# Patient Record
Sex: FEMALE | Race: WHITE
[De-identification: names, ages, dates, MRNs, and addresses within clinical notes are randomized per-mention and may not be internally consistent; named-entity substitution may affect disease eponyms.]

---

## 2017-07-22 ENCOUNTER — HEALTH MAINTENANCE LETTER (OUTPATIENT)
Age: 62
End: 2017-07-22

## 2017-11-29 ENCOUNTER — TRANSFERRED RECORDS (OUTPATIENT)
Dept: HEALTH INFORMATION MANAGEMENT | Facility: CLINIC | Age: 62
End: 2017-11-29

## 2018-01-29 ENCOUNTER — TRANSFERRED RECORDS (OUTPATIENT)
Dept: HEALTH INFORMATION MANAGEMENT | Facility: CLINIC | Age: 63
End: 2018-01-29

## 2018-03-01 ENCOUNTER — HOSPITAL ENCOUNTER (OUTPATIENT)
Dept: LAB | Facility: CLINIC | Age: 63
Discharge: HOME | End: 2018-03-01
Attending: ORTHOPAEDIC SURGERY | Admitting: ORTHOPAEDIC SURGERY
Payer: COMMERCIAL

## 2018-03-01 DIAGNOSIS — Z01.812 PRE-OPERATIVE LABORATORY EXAMINATION: ICD-10-CM

## 2018-03-01 LAB
MRSA DNA SPEC QL NAA+PROBE: NEGATIVE
SPECIMEN SOURCE: NORMAL

## 2018-03-01 PROCEDURE — 40000829 ZZHCL STATISTIC STAPH AUREUS SUSCEPT SCREEN PCR: Performed by: ORTHOPAEDIC SURGERY

## 2018-03-01 PROCEDURE — 87641 MR-STAPH DNA AMP PROBE: CPT | Performed by: ORTHOPAEDIC SURGERY

## 2018-03-01 PROCEDURE — 40000830 ZZHCL STATISTIC STAPH AUREUS METH RESIST SCREEN PCR: Performed by: ORTHOPAEDIC SURGERY

## 2018-03-01 PROCEDURE — 87640 STAPH A DNA AMP PROBE: CPT | Performed by: ORTHOPAEDIC SURGERY

## 2018-03-28 ENCOUNTER — ANESTHESIA EVENT (OUTPATIENT)
Dept: SURGERY | Facility: CLINIC | Age: 63
DRG: 470 | End: 2018-03-28
Payer: COMMERCIAL

## 2018-03-29 ENCOUNTER — SURGERY (OUTPATIENT)
Age: 63
End: 2018-03-29

## 2018-03-29 ENCOUNTER — APPOINTMENT (OUTPATIENT)
Dept: PHYSICAL THERAPY | Facility: CLINIC | Age: 63
DRG: 470 | End: 2018-03-29
Attending: ORTHOPAEDIC SURGERY
Payer: COMMERCIAL

## 2018-03-29 ENCOUNTER — ANESTHESIA (OUTPATIENT)
Dept: SURGERY | Facility: CLINIC | Age: 63
DRG: 470 | End: 2018-03-29
Payer: COMMERCIAL

## 2018-03-29 ENCOUNTER — HOSPITAL ENCOUNTER (INPATIENT)
Facility: CLINIC | Age: 63
LOS: 1 days | Discharge: HOME | DRG: 470 | End: 2018-03-30
Attending: ORTHOPAEDIC SURGERY | Admitting: ORTHOPAEDIC SURGERY
Payer: COMMERCIAL

## 2018-03-29 DIAGNOSIS — Z96.659 STATUS POST TOTAL KNEE REPLACEMENT, UNSPECIFIED LATERALITY: Primary | ICD-10-CM

## 2018-03-29 LAB
ABO + RH BLD: NORMAL
ABO + RH BLD: NORMAL
ALBUMIN UR-MCNC: NEGATIVE MG/DL
APPEARANCE UR: CLEAR
BILIRUB UR QL STRIP: NEGATIVE
BLD GP AB SCN SERPL QL: NORMAL
BLOOD BANK CMNT PATIENT-IMP: NORMAL
COLOR UR AUTO: YELLOW
GLUCOSE SERPL-MCNC: 99 MG/DL (ref 70–99)
GLUCOSE UR STRIP-MCNC: NEGATIVE MG/DL
HCG UR QL: NEGATIVE
HGB BLD-MCNC: 14 G/DL (ref 11.7–15.7)
HGB UR QL STRIP: NEGATIVE
INR PPP: 1.17 (ref 0.86–1.14)
KETONES UR STRIP-MCNC: NEGATIVE MG/DL
LEUKOCYTE ESTERASE UR QL STRIP: NEGATIVE
MUCOUS THREADS #/AREA URNS LPF: PRESENT /LPF
NITRATE UR QL: NEGATIVE
PH UR STRIP: 5.5 PH (ref 5–7)
RBC #/AREA URNS AUTO: <1 /HPF (ref 0–2)
SOURCE: ABNORMAL
SP GR UR STRIP: 1.02 (ref 1–1.03)
SPECIMEN EXP DATE BLD: NORMAL
SQUAMOUS #/AREA URNS AUTO: <1 /HPF (ref 0–1)
UROBILINOGEN UR STRIP-MCNC: NORMAL MG/DL (ref 0–2)
WBC #/AREA URNS AUTO: 1 /HPF (ref 0–5)

## 2018-03-29 PROCEDURE — 86850 RBC ANTIBODY SCREEN: CPT | Performed by: ORTHOPAEDIC SURGERY

## 2018-03-29 PROCEDURE — 81001 URINALYSIS AUTO W/SCOPE: CPT | Performed by: ORTHOPAEDIC SURGERY

## 2018-03-29 PROCEDURE — 97110 THERAPEUTIC EXERCISES: CPT | Mod: GP | Performed by: PHYSICAL THERAPIST

## 2018-03-29 PROCEDURE — 25000128 H RX IP 250 OP 636: Performed by: ORTHOPAEDIC SURGERY

## 2018-03-29 PROCEDURE — 97530 THERAPEUTIC ACTIVITIES: CPT | Mod: GP | Performed by: PHYSICAL THERAPIST

## 2018-03-29 PROCEDURE — 27210995 ZZH RX 272: Performed by: ORTHOPAEDIC SURGERY

## 2018-03-29 PROCEDURE — 85610 PROTHROMBIN TIME: CPT | Performed by: ORTHOPAEDIC SURGERY

## 2018-03-29 PROCEDURE — 82947 ASSAY GLUCOSE BLOOD QUANT: CPT | Performed by: ORTHOPAEDIC SURGERY

## 2018-03-29 PROCEDURE — 25000128 H RX IP 250 OP 636: Performed by: NURSE ANESTHETIST, CERTIFIED REGISTERED

## 2018-03-29 PROCEDURE — 86901 BLOOD TYPING SEROLOGIC RH(D): CPT | Performed by: ORTHOPAEDIC SURGERY

## 2018-03-29 PROCEDURE — 36415 COLL VENOUS BLD VENIPUNCTURE: CPT | Performed by: ORTHOPAEDIC SURGERY

## 2018-03-29 PROCEDURE — 27810169 ZZH OR IMPLANT GENERAL: Performed by: ORTHOPAEDIC SURGERY

## 2018-03-29 PROCEDURE — 85018 HEMOGLOBIN: CPT | Performed by: ORTHOPAEDIC SURGERY

## 2018-03-29 PROCEDURE — 12000001 ZZH R&B MED SURG/OB UMMC

## 2018-03-29 PROCEDURE — 86900 BLOOD TYPING SEROLOGIC ABO: CPT | Performed by: ORTHOPAEDIC SURGERY

## 2018-03-29 PROCEDURE — 71000014 ZZH RECOVERY PHASE 1 LEVEL 2 FIRST HR: Performed by: ORTHOPAEDIC SURGERY

## 2018-03-29 PROCEDURE — 25800025 ZZH RX 258: Performed by: ORTHOPAEDIC SURGERY

## 2018-03-29 PROCEDURE — 25000125 ZZHC RX 250: Performed by: NURSE ANESTHETIST, CERTIFIED REGISTERED

## 2018-03-29 PROCEDURE — 36000064 ZZH SURGERY LEVEL 4 EA 15 ADDTL MIN - UMMC: Performed by: ORTHOPAEDIC SURGERY

## 2018-03-29 PROCEDURE — 27210794 ZZH OR GENERAL SUPPLY STERILE: Performed by: ORTHOPAEDIC SURGERY

## 2018-03-29 PROCEDURE — 25000132 ZZH RX MED GY IP 250 OP 250 PS 637: Performed by: ORTHOPAEDIC SURGERY

## 2018-03-29 PROCEDURE — 40000170 ZZH STATISTIC PRE-PROCEDURE ASSESSMENT II: Performed by: ORTHOPAEDIC SURGERY

## 2018-03-29 PROCEDURE — 25000125 ZZHC RX 250: Performed by: ORTHOPAEDIC SURGERY

## 2018-03-29 PROCEDURE — 40000193 ZZH STATISTIC PT WARD VISIT: Performed by: PHYSICAL THERAPIST

## 2018-03-29 PROCEDURE — 0SRD0J9 REPLACEMENT OF LEFT KNEE JOINT WITH SYNTHETIC SUBSTITUTE, CEMENTED, OPEN APPROACH: ICD-10-PCS | Performed by: ORTHOPAEDIC SURGERY

## 2018-03-29 PROCEDURE — 37000009 ZZH ANESTHESIA TECHNICAL FEE, EACH ADDTL 15 MIN: Performed by: ORTHOPAEDIC SURGERY

## 2018-03-29 PROCEDURE — 97161 PT EVAL LOW COMPLEX 20 MIN: CPT | Mod: GP | Performed by: PHYSICAL THERAPIST

## 2018-03-29 PROCEDURE — 81025 URINE PREGNANCY TEST: CPT | Performed by: ORTHOPAEDIC SURGERY

## 2018-03-29 PROCEDURE — C1776 JOINT DEVICE (IMPLANTABLE): HCPCS | Performed by: ORTHOPAEDIC SURGERY

## 2018-03-29 PROCEDURE — 36000062 ZZH SURGERY LEVEL 4 1ST 30 MIN - UMMC: Performed by: ORTHOPAEDIC SURGERY

## 2018-03-29 PROCEDURE — 0QPH04Z REMOVAL OF INTERNAL FIXATION DEVICE FROM LEFT TIBIA, OPEN APPROACH: ICD-10-PCS | Performed by: ORTHOPAEDIC SURGERY

## 2018-03-29 PROCEDURE — 37000008 ZZH ANESTHESIA TECHNICAL FEE, 1ST 30 MIN: Performed by: ORTHOPAEDIC SURGERY

## 2018-03-29 PROCEDURE — 97116 GAIT TRAINING THERAPY: CPT | Mod: GP | Performed by: PHYSICAL THERAPIST

## 2018-03-29 PROCEDURE — 25000125 ZZHC RX 250: Performed by: ANESTHESIOLOGY

## 2018-03-29 DEVICE — IMP COMP PATELLA STRK TRI SYM X3 31X9MM 5550-G-319: Type: IMPLANTABLE DEVICE | Site: KNEE | Status: FUNCTIONAL

## 2018-03-29 DEVICE — IMP BASEPLATE TIBIAL HOWM TRI 4 5520-B-400: Type: IMPLANTABLE DEVICE | Site: KNEE | Status: FUNCTIONAL

## 2018-03-29 DEVICE — BONE CEMENT STRK SIMPLEX P SPEEDSET 6192-1-001: Type: IMPLANTABLE DEVICE | Site: KNEE | Status: FUNCTIONAL

## 2018-03-29 DEVICE — IMP INSERT TIBIAL HOWM TRI 4X09MM 5530-G-409: Type: IMPLANTABLE DEVICE | Site: KNEE | Status: FUNCTIONAL

## 2018-03-29 DEVICE — IMP COMP FEM STRK TRIATHLN CR LT 5 5510-F-501: Type: IMPLANTABLE DEVICE | Site: KNEE | Status: FUNCTIONAL

## 2018-03-29 RX ORDER — FENTANYL CITRATE 50 UG/ML
25-50 INJECTION, SOLUTION INTRAMUSCULAR; INTRAVENOUS
Status: DISCONTINUED | OUTPATIENT
Start: 2018-03-29 | End: 2018-03-29 | Stop reason: HOSPADM

## 2018-03-29 RX ORDER — SODIUM CHLORIDE, SODIUM LACTATE, POTASSIUM CHLORIDE, CALCIUM CHLORIDE 600; 310; 30; 20 MG/100ML; MG/100ML; MG/100ML; MG/100ML
INJECTION, SOLUTION INTRAVENOUS CONTINUOUS PRN
Status: DISCONTINUED | OUTPATIENT
Start: 2018-03-29 | End: 2018-03-29

## 2018-03-29 RX ORDER — GABAPENTIN 100 MG/1
300 CAPSULE ORAL ONCE
Status: DISCONTINUED | OUTPATIENT
Start: 2018-03-29 | End: 2018-03-29 | Stop reason: HOSPADM

## 2018-03-29 RX ORDER — MULTIPLE VITAMINS W/ MINERALS TAB 9MG-400MCG
1 TAB ORAL DAILY
COMMUNITY

## 2018-03-29 RX ORDER — FENTANYL CITRATE 50 UG/ML
INJECTION, SOLUTION INTRAMUSCULAR; INTRAVENOUS PRN
Status: DISCONTINUED | OUTPATIENT
Start: 2018-03-29 | End: 2018-03-29

## 2018-03-29 RX ORDER — PROPOFOL 10 MG/ML
INJECTION, EMULSION INTRAVENOUS CONTINUOUS PRN
Status: DISCONTINUED | OUTPATIENT
Start: 2018-03-29 | End: 2018-03-29

## 2018-03-29 RX ORDER — METHOCARBAMOL 750 MG/1
750 TABLET, FILM COATED ORAL 4 TIMES DAILY PRN
Status: DISCONTINUED | OUTPATIENT
Start: 2018-03-29 | End: 2018-03-30 | Stop reason: HOSPADM

## 2018-03-29 RX ORDER — LIDOCAINE 40 MG/G
CREAM TOPICAL
Status: DISCONTINUED | OUTPATIENT
Start: 2018-03-29 | End: 2018-03-30 | Stop reason: HOSPADM

## 2018-03-29 RX ORDER — ONDANSETRON 2 MG/ML
INJECTION INTRAMUSCULAR; INTRAVENOUS PRN
Status: DISCONTINUED | OUTPATIENT
Start: 2018-03-29 | End: 2018-03-29

## 2018-03-29 RX ORDER — CEFAZOLIN SODIUM 1 G
1 VIAL (EA) INJECTION EVERY 8 HOURS
Status: COMPLETED | OUTPATIENT
Start: 2018-03-29 | End: 2018-03-30

## 2018-03-29 RX ORDER — ACETAMINOPHEN 325 MG/1
975 TABLET ORAL ONCE
Status: DISCONTINUED | OUTPATIENT
Start: 2018-03-29 | End: 2018-03-29 | Stop reason: HOSPADM

## 2018-03-29 RX ORDER — ACETAMINOPHEN 325 MG/1
975 TABLET ORAL EVERY 8 HOURS
Status: DISCONTINUED | OUTPATIENT
Start: 2018-03-29 | End: 2018-03-30 | Stop reason: HOSPADM

## 2018-03-29 RX ORDER — MAGNESIUM HYDROXIDE 1200 MG/15ML
LIQUID ORAL PRN
Status: DISCONTINUED | OUTPATIENT
Start: 2018-03-29 | End: 2018-03-29 | Stop reason: HOSPADM

## 2018-03-29 RX ORDER — AMOXICILLIN 250 MG
1 CAPSULE ORAL 2 TIMES DAILY
Status: DISCONTINUED | OUTPATIENT
Start: 2018-03-29 | End: 2018-03-30 | Stop reason: HOSPADM

## 2018-03-29 RX ORDER — FAMOTIDINE 20 MG
1000 TABLET ORAL DAILY
COMMUNITY

## 2018-03-29 RX ORDER — SODIUM CHLORIDE, SODIUM LACTATE, POTASSIUM CHLORIDE, CALCIUM CHLORIDE 600; 310; 30; 20 MG/100ML; MG/100ML; MG/100ML; MG/100ML
INJECTION, SOLUTION INTRAVENOUS CONTINUOUS
Status: DISCONTINUED | OUTPATIENT
Start: 2018-03-29 | End: 2018-03-30 | Stop reason: HOSPADM

## 2018-03-29 RX ORDER — BISACODYL 10 MG
10 SUPPOSITORY, RECTAL RECTAL DAILY
Status: DISCONTINUED | OUTPATIENT
Start: 2018-03-30 | End: 2018-03-30 | Stop reason: HOSPADM

## 2018-03-29 RX ORDER — CEFAZOLIN SODIUM 1 G/3ML
1 INJECTION, POWDER, FOR SOLUTION INTRAMUSCULAR; INTRAVENOUS SEE ADMIN INSTRUCTIONS
Status: DISCONTINUED | OUTPATIENT
Start: 2018-03-29 | End: 2018-03-29 | Stop reason: HOSPADM

## 2018-03-29 RX ORDER — OXYCODONE HYDROCHLORIDE 5 MG/1
5-10 TABLET ORAL
Status: DISCONTINUED | OUTPATIENT
Start: 2018-03-29 | End: 2018-03-30 | Stop reason: HOSPADM

## 2018-03-29 RX ORDER — NALOXONE HYDROCHLORIDE 0.4 MG/ML
.1-.4 INJECTION, SOLUTION INTRAMUSCULAR; INTRAVENOUS; SUBCUTANEOUS
Status: DISCONTINUED | OUTPATIENT
Start: 2018-03-29 | End: 2018-03-29

## 2018-03-29 RX ORDER — CEFAZOLIN SODIUM 2 G/100ML
2 INJECTION, SOLUTION INTRAVENOUS
Status: COMPLETED | OUTPATIENT
Start: 2018-03-29 | End: 2018-03-29

## 2018-03-29 RX ORDER — SODIUM PHOSPHATE,MONO-DIBASIC 19G-7G/118
1 ENEMA (ML) RECTAL 2 TIMES DAILY
COMMUNITY

## 2018-03-29 RX ORDER — LIDOCAINE HYDROCHLORIDE 20 MG/ML
INJECTION, SOLUTION INFILTRATION; PERINEURAL PRN
Status: DISCONTINUED | OUTPATIENT
Start: 2018-03-29 | End: 2018-03-29

## 2018-03-29 RX ORDER — ONDANSETRON 2 MG/ML
4 INJECTION INTRAMUSCULAR; INTRAVENOUS EVERY 30 MIN PRN
Status: DISCONTINUED | OUTPATIENT
Start: 2018-03-29 | End: 2018-03-29 | Stop reason: HOSPADM

## 2018-03-29 RX ORDER — CELECOXIB 200 MG/1
200 CAPSULE ORAL ONCE
Status: COMPLETED | OUTPATIENT
Start: 2018-03-29 | End: 2018-03-29

## 2018-03-29 RX ORDER — ONDANSETRON 4 MG/1
4 TABLET, ORALLY DISINTEGRATING ORAL EVERY 6 HOURS PRN
Status: DISCONTINUED | OUTPATIENT
Start: 2018-03-29 | End: 2018-03-30 | Stop reason: HOSPADM

## 2018-03-29 RX ORDER — SODIUM CHLORIDE, SODIUM LACTATE, POTASSIUM CHLORIDE, CALCIUM CHLORIDE 600; 310; 30; 20 MG/100ML; MG/100ML; MG/100ML; MG/100ML
INJECTION, SOLUTION INTRAVENOUS CONTINUOUS
Status: DISCONTINUED | OUTPATIENT
Start: 2018-03-29 | End: 2018-03-29 | Stop reason: HOSPADM

## 2018-03-29 RX ORDER — WARFARIN SODIUM 5 MG/1
5 TABLET ORAL
Status: COMPLETED | OUTPATIENT
Start: 2018-03-29 | End: 2018-03-29

## 2018-03-29 RX ORDER — ONDANSETRON 4 MG/1
4 TABLET, ORALLY DISINTEGRATING ORAL EVERY 30 MIN PRN
Status: DISCONTINUED | OUTPATIENT
Start: 2018-03-29 | End: 2018-03-29 | Stop reason: HOSPADM

## 2018-03-29 RX ORDER — ACETAMINOPHEN 325 MG/1
650 TABLET ORAL EVERY 4 HOURS PRN
Status: DISCONTINUED | OUTPATIENT
Start: 2018-04-01 | End: 2018-03-30 | Stop reason: HOSPADM

## 2018-03-29 RX ORDER — ESTRADIOL 0.05 MG/D
1 PATCH, EXTENDED RELEASE TRANSDERMAL
Status: DISCONTINUED | OUTPATIENT
Start: 2018-03-29 | End: 2018-03-29

## 2018-03-29 RX ORDER — ACETAMINOPHEN 325 MG/1
975 TABLET ORAL ONCE
Status: COMPLETED | OUTPATIENT
Start: 2018-03-29 | End: 2018-03-29

## 2018-03-29 RX ORDER — AMOXICILLIN 250 MG
2 CAPSULE ORAL 2 TIMES DAILY
Status: DISCONTINUED | OUTPATIENT
Start: 2018-03-29 | End: 2018-03-30 | Stop reason: HOSPADM

## 2018-03-29 RX ORDER — ONDANSETRON 2 MG/ML
4 INJECTION INTRAMUSCULAR; INTRAVENOUS EVERY 6 HOURS PRN
Status: DISCONTINUED | OUTPATIENT
Start: 2018-03-29 | End: 2018-03-30 | Stop reason: HOSPADM

## 2018-03-29 RX ORDER — BUPIVACAINE HYDROCHLORIDE 7.5 MG/ML
INJECTION, SOLUTION INTRASPINAL PRN
Status: DISCONTINUED | OUTPATIENT
Start: 2018-03-29 | End: 2018-03-29

## 2018-03-29 RX ORDER — GABAPENTIN 100 MG/1
300 CAPSULE ORAL
Status: COMPLETED | OUTPATIENT
Start: 2018-03-29 | End: 2018-03-29

## 2018-03-29 RX ORDER — NALOXONE HYDROCHLORIDE 0.4 MG/ML
.1-.4 INJECTION, SOLUTION INTRAMUSCULAR; INTRAVENOUS; SUBCUTANEOUS
Status: ACTIVE | OUTPATIENT
Start: 2018-03-29 | End: 2018-03-30

## 2018-03-29 RX ADMIN — WATER 1 G: 100 INJECTION, SOLUTION INTRAVENOUS at 18:36

## 2018-03-29 RX ADMIN — CELECOXIB 200 MG: 200 CAPSULE ORAL at 06:28

## 2018-03-29 RX ADMIN — PROPOFOL 100 MCG/KG/MIN: 10 INJECTION, EMULSION INTRAVENOUS at 08:19

## 2018-03-29 RX ADMIN — MIDAZOLAM 2 MG: 1 INJECTION INTRAMUSCULAR; INTRAVENOUS at 08:03

## 2018-03-29 RX ADMIN — EPINEPHRINE: 1 INJECTION PARENTERAL at 10:38

## 2018-03-29 RX ADMIN — SODIUM CHLORIDE, POTASSIUM CHLORIDE, SODIUM LACTATE AND CALCIUM CHLORIDE: 600; 310; 30; 20 INJECTION, SOLUTION INTRAVENOUS at 08:03

## 2018-03-29 RX ADMIN — FENTANYL CITRATE 50 MCG: 50 INJECTION, SOLUTION INTRAMUSCULAR; INTRAVENOUS at 11:40

## 2018-03-29 RX ADMIN — ONDANSETRON 4 MG: 2 INJECTION INTRAMUSCULAR; INTRAVENOUS at 09:56

## 2018-03-29 RX ADMIN — OXYCODONE HYDROCHLORIDE 5 MG: 5 TABLET ORAL at 18:39

## 2018-03-29 RX ADMIN — CEFAZOLIN 1 G: 1 INJECTION, POWDER, FOR SOLUTION INTRAMUSCULAR; INTRAVENOUS at 10:50

## 2018-03-29 RX ADMIN — SODIUM CHLORIDE, POTASSIUM CHLORIDE, SODIUM LACTATE AND CALCIUM CHLORIDE: 600; 310; 30; 20 INJECTION, SOLUTION INTRAVENOUS at 13:52

## 2018-03-29 RX ADMIN — LIDOCAINE HYDROCHLORIDE 60 MG: 20 INJECTION, SOLUTION INFILTRATION; PERINEURAL at 08:19

## 2018-03-29 RX ADMIN — WARFARIN SODIUM 5 MG: 5 TABLET ORAL at 18:35

## 2018-03-29 RX ADMIN — OXYCODONE HYDROCHLORIDE 5 MG: 5 TABLET ORAL at 22:57

## 2018-03-29 RX ADMIN — SODIUM CHLORIDE, POTASSIUM CHLORIDE, SODIUM LACTATE AND CALCIUM CHLORIDE: 600; 310; 30; 20 INJECTION, SOLUTION INTRAVENOUS at 11:09

## 2018-03-29 RX ADMIN — SODIUM CHLORIDE 3000 ML: 900 IRRIGANT IRRIGATION at 10:40

## 2018-03-29 RX ADMIN — SODIUM CHLORIDE 500 ML: 900 IRRIGANT IRRIGATION at 10:38

## 2018-03-29 RX ADMIN — ACETAMINOPHEN 975 MG: 325 TABLET ORAL at 13:51

## 2018-03-29 RX ADMIN — GABAPENTIN 300 MG: 300 CAPSULE ORAL at 06:29

## 2018-03-29 RX ADMIN — ACETAMINOPHEN 975 MG: 325 TABLET ORAL at 21:42

## 2018-03-29 RX ADMIN — EPINEPHRINE: 1 INJECTION PARENTERAL at 10:28

## 2018-03-29 RX ADMIN — SENNOSIDES AND DOCUSATE SODIUM 2 TABLET: 8.6; 5 TABLET ORAL at 21:43

## 2018-03-29 RX ADMIN — ACETAMINOPHEN 975 MG: 325 TABLET ORAL at 06:28

## 2018-03-29 RX ADMIN — MIDAZOLAM 1 MG: 1 INJECTION INTRAMUSCULAR; INTRAVENOUS at 08:15

## 2018-03-29 RX ADMIN — BUPIVACAINE HYDROCHLORIDE IN DEXTROSE 1.4 ML: 7.5 INJECTION, SOLUTION SUBARACHNOID at 08:16

## 2018-03-29 RX ADMIN — SODIUM CHLORIDE 1 G: 9 INJECTION, SOLUTION INTRAVENOUS at 08:15

## 2018-03-29 RX ADMIN — MIDAZOLAM 1 MG: 1 INJECTION INTRAMUSCULAR; INTRAVENOUS at 08:13

## 2018-03-29 RX ADMIN — OXYCODONE HYDROCHLORIDE 5 MG: 5 TABLET ORAL at 12:06

## 2018-03-29 RX ADMIN — CEFAZOLIN SODIUM 2 G: 2 INJECTION, SOLUTION INTRAVENOUS at 08:26

## 2018-03-29 NOTE — PROGRESS NOTES
03/29/18 1630   Quick Adds   Type of Visit Initial PT Evaluation       Present no   Language English   Living Environment   Lives With spouse;other (see comments)  (adult daughter can assist)   Living Arrangements house   Home Accessibility stairs (1 railing present);stairs (2 railings present);stairs to enter home;stairs within home   Number of Stairs to Enter Home 3   Number of Stairs Within Home 12   Stair Railings at Home inside, present on right side;outside, present at both sides   Transportation Available car;family or friend will provide   Living Environment Comment Pt reported living in a multi-level home.     Self-Care   Dominant Hand right   Usual Activity Tolerance excellent   Current Activity Tolerance moderate   Regular Exercise other (see comments)  (Pt very active at baseline (eliptical, walking, kayaking).)   Equipment Currently Used at Home none   Activity/Exercise/Self-Care Comment Pt reported being independent with all ADLs at baseline.    Functional Level Prior   Ambulation 0-->independent   Transferring 0-->independent   Toileting 0-->independent   Bathing 0-->independent   Dressing 0-->independent   Eating 0-->independent   Communication 0-->understands/communicates without difficulty   Swallowing 0-->swallows foods/liquids without difficulty   Cognition 0 - no cognition issues reported   Which of the above functional risks had a recent onset or change? ambulation;transferring   Prior Functional Level Comment Pt reported being independent with all functional mobility at baseline.    General Information   Onset of Illness/Injury or Date of Surgery - Date 03/29/18   Referring Physician Pamela Richter MD   Patient/Family Goals Statement Pt would like to continue her active lifestyle.    Pertinent History of Current Problem (include personal factors and/or comorbidities that impact the POC) Pt is s/p left TKA, see chart for PMH.    Precautions/Limitations fall  precautions  (no pillow under left knee)   Weight-Bearing Status - LUE full weight-bearing   Weight-Bearing Status - RUE full weight-bearing   Weight-Bearing Status - LLE weight-bearing as tolerated   Weight-Bearing Status - RLE full weight-bearing   General Observations Pt sitting in chair at start of PT evaluation with hemovac in place and getting IV fluids.    Cognitive Status Examination   Orientation other (see comments)  (Not tested)   Level of Consciousness alert   Follows Commands and Answers Questions 100% of the time;able to follow multistep instructions   Personal Safety and Judgment intact   Memory intact   Pain Assessment   Patient Currently in Pain Yes, see Vital Sign flowsheet   Integumentary/Edema   Integumentary/Edema other (describe)   Integumentary/Edema Comments Incision not observed secondary to post-op dressing in place.    Posture    Posture Not impaired   Range of Motion (ROM)   ROM Comment Left knee ROM 0-7-96 degrees  (Measured in sitting. )   Strength   Strength Comments LE strength not formally tested but pt is able to complete SLR independently and lift left LE into bed with no assist.    Bed Mobility   Bed Mobility Comments Sitting to supine with HOB elevated and SBA x 1.    Transfer Skills   Transfer Comments Sit to/from chair with FWW and CGA x 1.     Gait   Gait Comments Pt ambulated 300' with FWW and CGA x 1.  Pt demonstrating decreased heel strike.    Balance   Balance Comments Pt demonstrated good sitting balance and fair standing balance with axillary crutches.    Sensory Examination   Sensory Perception Comments Pt has decreased sensation along left LE due to block in effect.    General Therapy Interventions   Planned Therapy Interventions bed mobility training;gait training;ROM;strengthening;transfer training;home program guidelines   Intervention Comments Bed mobility, transfers, gait, and LE strengthening/ROM initiated.    Clinical Impression   Criteria for Skilled  "Therapeutic Intervention yes, treatment indicated   PT Diagnosis Decreased strength, decreased ROM, and impaired functional mobility.    Influenced by the following impairments Post-op pain, weakness, decreased ROM, and s/p left TKA.    Functional limitations due to impairments Impaired bed mobility, transfers, and gait.    Clinical Presentation Stable/Uncomplicated   Clinical Presentation Rationale Pt is a 63 y/o female s/p left TKA.   Pt is very active at baseline and does not have any significant PMH that would impact PT POC.    Clinical Decision Making (Complexity) Low complexity   Therapy Frequency` 2 times/day   Predicted Duration of Therapy Intervention (days/wks) 2 days   Anticipated Equipment Needs at Discharge crutches   Anticipated Discharge Disposition Home with Outpatient Therapy   Risk & Benefits of therapy have been explained Yes   Patient, Family & other staff in agreement with plan of care Yes   [facility] TM \"6 Clicks\"   2016, Trustees of [facility], under license to [employer].  All rights reserved.   6 Clicks Short Forms Basic Mobility Inpatient Short Form   [facility]  \"6 Clicks\" V.2 Basic Mobility Inpatient Short Form   1. Turning from your back to your side while in a flat bed without using bedrails? 4 - None   2. Moving from lying on your back to sitting on the side of a flat bed without using bedrails? 4 - None   3. Moving to and from a bed to a chair (including a wheelchair)? 4 - None   4. Standing up from a chair using your arms (e.g., wheelchair, or bedside chair)? 3 - A Little   5. To walk in hospital room? 3 - A Little   6. Climbing 3-5 steps with a railing? 3 - A Little   Basic Mobility Raw Score (Score out of 24.Lower scores equate to lower levels of function) 21   Total Evaluation Time   Total Evaluation Time (Minutes) 9     "

## 2018-03-29 NOTE — PHARMACY-ADMISSION MEDICATION HISTORY
Admission medication history for the March 29, 2018 admission is complete.     Interview sources:  Patient    Reliability of source: moderate - pt knew names of medications, but not all doses    Medication compliance: n/a - patient is not taking prescription medications    Changes made to PTA medication list (reason)  Added: none  Deleted: none  Changed: updated dose and directions for vitamin D, glucosamine, fish oil    Additional medication history information:   - pt stopped taking estradiol and progesterone about 1 month ago in preparation for surgery. Pt reports her outpatient MD plans to officially discontinued both medications. She does not want to take them while inpatient.     Prior to Admission medications    Medication Sig Last Dose Taking? Auth Provider   multivitamin, therapeutic with minerals (THERA-VIT-M) TABS tablet Take 1 tablet by mouth daily Past Week Yes Unknown, Entered By History   glucosamine-chondroitin 500-400 MG CAPS per capsule Take 1 capsule by mouth 2 times daily Past Week Yes Unknown, Entered By History   Vitamin D, Cholecalciferol, 1000 UNITS CAPS Take 1,000 Units by mouth daily Past Week Yes Unknown, Entered By History   estradiol (VIVELLE-DOT) 0.05 MG/24HR patch Place 1 patch onto the skin twice a week plan to dc by outpatient MD Yes Reported, Patient   Progesterone Micronized (PROGESTERONE PO) Take 1 tablet by mouth daily plan to dc by outpatient MD Yes Reported, Patient   Omega-3 Fatty Acids (OMEGA-3 FISH OIL PO) Take 2 capsules by mouth 2 times daily  Past Week Yes Reported, Patient       Time spent: 15 minutes    Medication history completed by:   Maira Cortez, PharmD

## 2018-03-29 NOTE — ANESTHESIA POSTPROCEDURE EVALUATION
Patient: Agatha Fung    Procedure(s):  Left Total Knee Arthroplasty - Wound Class: I-Clean    Diagnosis:Knee Osteoarthritis  Diagnosis Additional Information: No value filed.    Anesthesia Type:  Spinal, MAC    Note:  Anesthesia Post Evaluation    Patient location during evaluation: PACU  Patient participation: Able to fully participate in evaluation  Level of consciousness: awake  Pain management: adequate  Airway patency: patent  Cardiovascular status: acceptable and stable  Respiratory status: acceptable and room air  Hydration status: acceptable  PONV: none     Anesthetic complications: None          Last vitals:  Vitals:    03/29/18 1215 03/29/18 1230 03/29/18 1247   BP: 112/69 122/71 131/72   Pulse:   66   Resp: 10 17 20   Temp:   35.6  C (96.1  F)   SpO2: 96% 100% 100%         Electronically Signed By: Eb Allan MD  March 29, 2018  12:57 PM

## 2018-03-29 NOTE — PHARMACY-ANTICOAGULATION SERVICE
Clinical Pharmacy - Warfarin Dosing Consult     Pharmacy has been consulted to manage this patient s warfarin therapy.  Indication: DVT/PE Prophylaxis  Therapy Goal: INR 2-3  Provider/Team: Rafi Johnson MD  Warfarin Prior to Admission: No    INR   Date Value Ref Range Status   03/29/2018 1.17 (H) 0.86 - 1.14 Final       Recommend warfarin 5 mg today.  Pharmacy will monitor Agatha Vinsonn daily and order warfarin doses to achieve specified goal.      Please contact pharmacy as soon as possible if the warfarin needs to be held for a procedure or if the warfarin goals change.

## 2018-03-29 NOTE — ANESTHESIA PREPROCEDURE EVALUATION
Anesthesia Evaluation     . Pt has had prior anesthetic. Type: General    History of anesthetic complications   - PONV        ROS/MED HX    ENT/Pulmonary:  - neg pulmonary ROS     Neurologic:  - neg neurologic ROS     Cardiovascular:  - neg cardiovascular ROS       METS/Exercise Tolerance:     Hematologic:  - neg hematologic  ROS       Musculoskeletal:  - neg musculoskeletal ROS       GI/Hepatic:  - neg GI/hepatic ROS       Renal/Genitourinary:  - ROS Renal section negative       Endo:  - neg endo ROS       Psychiatric:  - neg psychiatric ROS       Infectious Disease:  - neg infectious disease ROS       Malignancy:      - no malignancy   Other:    - neg other ROS                 Physical Exam  Normal systems: cardiovascular and pulmonary    Airway   Mallampati: II  TM distance: >3 FB  Neck ROM: full    Dental     Cardiovascular   Rhythm and rate: regular and normal      Pulmonary    breath sounds clear to auscultation                    Anesthesia Plan      History & Physical Review      ASA Status:  1 .    NPO Status:  > 8 hours    Plan for Spinal and MAC Reason for MAC:  Deep or markedly invasive procedure (G8)  PONV prophylaxis:  Ondansetron (or other 5HT-3)       Postoperative Care  Postoperative pain management:  Multi-modal analgesia.      Consents  Anesthetic plan, risks, benefits and alternatives discussed with:  Patient.  Use of blood products discussed: Yes.   Use of blood products discussed with Patient.  Consented to blood products.  .          ANESTHESIA PREOP EVALUATION    NPO Status: more then 6 hours    Procedure:     HPI:     PMHx/PSHx/ROS:  PAST MEDICAL HISTORY:   Past Medical History:   Diagnosis Date     PONV (postoperative nausea and vomiting)        PAST SURGICAL HISTORY:   Past Surgical History:   Procedure Laterality Date     ORTHOPEDIC SURGERY Left 2003    ACL reconstruction       FAMILY HISTORY: History reviewed. No pertinent family history.      Past Anes Hx: No personal or family h/o  anesthesia problems    Soc Hx:   Tobacco:   EtOH:    Allergies: No Known Allergies    Meds:   Prescriptions Prior to Admission   Medication Sig Dispense Refill Last Dose     estradiol (VIVELLE-DOT) 0.05 MG/24HR patch Place 1 patch onto the skin twice a week   Taking     Progesterone Micronized (PROGESTERONE PO) Take 1 tablet by mouth daily   Taking     GLUCOSAMINE CHONDROITIN COMPLX PO Take 1 tablet by mouth 2 times daily   Taking     Omega-3 Fatty Acids (OMEGA-3 FISH OIL PO) Take 2 tablets by mouth 2 times daily (with meals)   Taking     Cholecalciferol (VITAMIN D3 PO) Take 1 tablet by mouth daily   Taking     Multiple Vitamins-Minerals (MULTIVITAMIN PO) Take 1 tablet by mouth daily   Taking     IBUPROFEN PO Take 400 mg by mouth as needed for moderate pain   Taking       No current outpatient prescriptions on file.       Physical Exam:  VS: T 97.7, P 67, /88, R 18, SpO2 99%         Rakesh Allan MD    3/29/2018  6:43 AM  Risks, benefits, side effects and intended purposes discussed.                  .

## 2018-03-29 NOTE — IP AVS SNAPSHOT
MRN:[id number]                      After Visit Summary   3/29/2018    Agatha Fung    MRN: [id number]           Thank you!     Thank you for choosing [facility] for your care. Our goal is always to provide you with excellent care. Hearing back from our patients is one way we can continue to improve our services. Please take a few minutes to complete the written survey that you may receive in the mail after you visit with us. Thank you!        Patient Information     Date Of Birth          1955        Designated Caregiver       Most Recent Value    Caregiver    Will someone help with your care after discharge? yes    Name of designated caregiver Frandy ( )     Phone number of caregiver [phone]    Caregiver address Same as pt       About your hospital stay     You were admitted on:  March 29, 2018 You last received care in the:   8A    You were discharged on:  March 30, 2018        Reason for your hospital stay       You had knee surgery                  Who to Call     For medical emergencies, please call 911.  For non-urgent questions about your medical care, please call your primary care provider or clinic, [phone]  For questions related to your surgery, please call your surgery clinic        Attending Provider     Provider Pamela Cassidy MD Orthopedics       Primary Care Provider Office Phone # Fax #    Agatha King [phone]       When to contact your care team       CALL YOUR PHYSICIAN IF:  1.  Your pain begins to worsen and is unable to be controlled with your medications.  2.  Excessive redness or drainage of cloudy or bloody material from the wounds (Clear red tinted fluid and some mild drainage should be expected). Drainage of any kind 5 days after surgery should be reported to the doctor.  3.  You have a temperature elevation greater than 101.5    4.  You have pain, swelling or redness in your calf. You have  numbness or weakness in your leg or foot.    During regular business hours [place] [facility] [place] [phone] and ask for the triage nurse. After hours or weekends call the hospital  at [phone] and ask for the ortho resident on call.                  After Care Instructions     Activity       Your activity upon discharge: as tolerated. Ice and elevate your knee when resting to decrease swelling.            Discharge Instructions       TOTAL KNEE ARTHROPLASTY POST OPERATIVE INSTRUCTIONS     COMFORT:  Elevation: Elevate your knee and ankle above the level of your heart. The best position is lying down with two pillows lengthwise under your entire leg. Do not place pillows under your knee. This should be done for the first several days after surgery.  Ice: An ice pack will be provided to control swelling and discomfort. You may apply the ice pack over the dressing for up to 20 minutes every hour for the first 48 hours after surgery. After this period, you may use the ice pack as needed. Always place a thin towel around the ice pack to prevent direct contact with the skin.   Pain medication: Take medication as prescribed, but only as often as necessary.  Do not drive a motor vehicle, operate machinery or appliances, make important decisions, sign legal documents, or drink alcohol while using narcotic pain medications. Remember that acetaminophen (Tylenol) can also be used for pain relief, and the maximum Tylenol dose for a single day is 3000 mg.            ACTIVITIES:  Exercises: Point and flex your feet and wiggle your toes, move your ankle around in a [place]  to help prevent complications such as blood clotting in your legs. Quad muscle isometric and short arc exercises should begin the day of surgery and should be done for 10 to 15 minutes, 3 times a day for the first 2 to 3 weeks after surgery. Focus on obtaining full hyperextension to 90 degrees knee flexion.  CPM: CPM should be set at 0 degrees to maximum  flexion tolerance. Advance aggressively in increments of 5 degrees toward goal of 90 degrees flexion.   Weight bearing status: You are allowed to put weight on your operative leg (weight bearing as tolerated) but may it be limited due to pain. Walk using two crutches. Duration of crutch use is typically 1-2 weeks for indoor movement and 2-4 weeks for outdoor activity.   Physical Therapy: A prescription and protocol sheet for physical therapy will be sent home with you and must be taken to your first therapy visit.  Driving: Driving is NOT permitted until allowed by your provider. Generally, this is 3 to 4 weeks following right knee surgery and 2 to 3 weeks following left knee surgery.  Athletic activities: Athletic activities such as swimming, bicycling, jogging, running and stop-and-go-sports should be avoided until allowed by your provider.   Return to work: You may return to work once allowed by your provider. Generally, patients with desk jobs may return to work in 3 to 4 days. Patients with heavy labor jobs usually can return to work in 3 months.    DIET:   Resume your regular diet as soon as tolerated. Try to increase your fluids, fibers, fruits, and vegetables to prevent constipation from pain medications.  WOUND CARES:   Dressing: Keep the initial post-op dressing clean, and dry for the first 3 days after surgery. You may then remove the dressing to shower. After showering, please apply a fresh dressing to the wound using 4x4s and tape. Continue these dressing changes until postoperative day 14.  Incision: The incision was closed with a buried absorbable running stitch, and a Dermabond PRINEO Skin Closure System  (clear mesh tape) was applied overtop. The clear mesh tape can be removed around postoperative day 14. The best way to do this is to gently grasp the mesh tape at one end of the wound and slowly peel it away from the skin along the line of the wound. Take care when peeling the mesh to ensure that  you are applying tension to the mesh in a direction that is parallel, and not perpendicular, to the skin surface. It is possible that the tape may begin to lift from the skin surface before postoperative day 14. If this occurs, you may trim any free edges that are no longer adhered to the skin surface. To do this, grasp the free mesh, apply gentle tension, and trim with a scissors.   Bathing: You may shower on postoperative day 3. Tub bathing, swimming, and soaking of the incision should be avoided until allowed by your provider.  CALL YOUR PROVIDER IMMEDIATELY IF:  Pain in your knee becomes more severe or uncontrollable after surgery.  You experience excessive redness, warmth, or drainage from the operative site. Although clear red tinted fluid and some mild drainage should be expected following surgery, drainage of any kind after 5 days should be reported to your provider.  You have a temperature elevation greater than 101.5 .   You have pain, swelling or redness in your calf.  You have numbness or weakness in your leg or foot.  You have persistent nausea or vomiting.    You may contact your physician at [phone] during business hours.  For after hours or weekend calls, you may contact the hospital at [phone] and ask for the on-call orthopedic resident  florinda            Wound care and dressings       Instructions to care for your wound at home:                  Follow-up Appointments     Follow Up and recommended labs and tests       INR Monday.                  Your next 10 appointments already scheduled     Apr 02, 2018  2:40 PM CDT   (Arrive by 2:25 PM)   TAINA Extremity with Yane Elena, PT   [facility] ([facility]  )    [address]  [facility] [place] [address]   [phone]            Apr 06, 2018 10:00 AM CDT   TAINA Extremity with Ishaan Beavers Pt, PT   [facility] ([facility]  )    [address]  [facility] [address]   [phone]            Apr 10, 2018  3:00 PM CDT   TAINA Extremity with Ishaan Beavers Pt, PT   [facility] ([facility]  )    [address]  [facility] [address]   [phone]            Apr 12, 2018  1:00 PM CDT   TAINA Extremity with Ishaan Beavers Pt, PT   [facility] ([facility]  )    [address]  [facility] [address]   [phone]              Additional Services     Physical Therapy Referral       [facility]  [address]  [place] [address]  Clinic phone: [phone]  Clinic fax: [phone]  Appointments: [phone]     Treatment: Evaluation & Treatment  Special Instructions/Modalities: None  Special Programs: None    Please be aware that coverage of these services is subject to the terms and limitations of your health insurance plan.  Call member services at your health plan with any benefit or coverage questions.      **Note to Provider:  If you are referring outside of [facility] for the therapy appointment, please list the name of the location in the \"special instructions\" above, print the referral and give to the patient to schedule the appointment.    Future appointments:    Monday, April 2 at 2:25pm with Merlene  Friday, April 6 at 10:00am with Ronald  Tuesday, April 10 at 3:00pm with Ronald  Thursday, April 12 at 1:00pm with Ronald                  Future tests that were ordered for you     Assign Questionnaire Series to Patient                 Warfarin Instruction     You have started taking a medicine called warfarin. This is a blood-thinning medicine (anticoagulant). It helps prevent and treat blood clots.      Before leaving the hospital, make sure you know how much to take and how long to take it.      You will need regular blood tests to " "make sure your blood is clotting safely. It is very important to see your doctor for regular blood tests.    Talk to your doctor before taking any new medicine (this includes over-the-counter drugs and herbal products). Many medicines can interact with warfarin. This may cause more bleeding or too much clotting.     Eating a lot of vitamin K--found in green, leafy vegetables--can change the way warfarin works in your body. Do NOT avoid these foods. Instead, try to eat the same amount each day.     Bleeding is the most common side-effect of warfarin. You may notice bleeding gums, a bloody nose, bruises and bleeding longer when you cut yourself. See a doctor at once if:   o You cough up blood  o You find blood in your stool (poop)  o You have a deep cut, or a cut that bleeds longer than 10 minutes   o You have a bad cut, hard fall, accident or hit your head (go to urgent care or the emergency room).    For women who can get pregnant: This medicine can harm an unborn baby. Be very careful not to get pregnant while taking this medicine. If you think you might be pregnant, call your doctor right away.    For more information, read \"Guide to Warfarin Therapy,  the booklet you received in the hospital.        Pending Results     No orders found for last 3 day(s).            Statement of Approval     Ordered          03/30/18 1253  I have reviewed and agree with all the recommendations and orders detailed in this document.  EFFECTIVE NOW     Approved and electronically signed by:  Yvette Cook APRN CNP             Admission Information     Date & Time Provider Department Dept. Phone    3/29/2018 Pamela Richter MD UR 8A [phone]      Your Vitals Were     Blood Pressure Pulse Temperature Respirations Height Weight    119/65 (BP Location: Left arm) 66 98.5  F (36.9  C) (Oral) 16 1.702 m (5' 7\") 72.3 kg (159 lb 6.3 oz)    Pulse Oximetry BMI (Body Mass Index)                99% 24.96 kg/m2          MyChart " Information     [facility] gives you secure access to your electronic health record. If you see a primary care provider, you can also send messages to your care team and make appointments. If you have questions, please call your primary care clinic.  If you do not have a primary care provider, please call [phone] and they will assist you.        Care EveryWhere ID     This is your Care EveryWhere ID. This could be used by other organizations to access your [facility] medical records  [id number]        Equal Access to Services     GREG LOCKETT : Hadii aad ku hadasho Soomaali, waaxda luqadaha, qaybta kaalmada adeegyada, waxay rachnain genoveva hughes . So [facility] [phone].    ATENCIÓN: Si habla español, tiene a montemayor disposición servicios gratuitos de asistencia lingüística. [facility] [phone].    We comply with applicable federal civil rights laws and [place] laws. We do not discriminate on the basis of race, color, national origin, age, disability, sex, sexual orientation, or gender identity.               Review of your medicines      START taking        Dose / Directions    acetaminophen 325 MG tablet   Commonly known as:  TYLENOL        Take 1-2 tablets every 4 hours as needed for pain.   Quantity:  100 tablet   Refills:  0       methocarbamol 750 MG tablet   Commonly known as:  ROBAXIN        Dose:  750 mg   Take 1 tablet (750 mg) by mouth 4 times daily as needed for muscle spasms   Quantity:  35 tablet   Refills:  0       order for DME        Equipment being ordered: Crutches () Treatment Diagnosis: gait instablity   Quantity:  1 each   Refills:  0       oxyCODONE IR 5 MG tablet   Commonly known as:  ROXICODONE        Dose:  5-10 mg   Take 1-2 tablets (5-10 mg) by mouth every 4 hours as needed for other (pain control or improvement in physical function. Hold dose for analgesic side effects.)   Quantity:  60 tablet   Refills:  0       senna-docusate 8.6-50 MG per tablet   Commonly known as:   SENOKOT-S;PERICOLACE        Dose:  1-2 tablet   Take 1-2 tablets by mouth 2 times daily Hold for loose stools. Discontinue as able when narcotic use is discontinued.   Quantity:  50 tablet   Refills:  0       warfarin 5 MG tablet   Commonly known as:  COUMADIN        Take 1 tablet today, tomorrow and Sunday. INR Monday. Additional instructions per anticoagulation clinic.   Quantity:  45 tablet   Refills:  0         CONTINUE these medicines which have NOT CHANGED        Dose / Directions    estradiol 0.05 MG/24HR BIW patch   Commonly known as:  VIVELLE-DOT        Dose:  1 patch   Place 1 patch onto the skin twice a week   Refills:  0       glucosamine-chondroitin 500-400 MG Caps per capsule        Dose:  1 capsule   Take 1 capsule by mouth 2 times daily   Refills:  0       multivitamin, therapeutic with minerals Tabs tablet        Dose:  1 tablet   Take 1 tablet by mouth daily   Refills:  0       OMEGA-3 FISH OIL PO        Dose:  2 capsule   Take 2 capsules by mouth 2 times daily   Refills:  0       PROGESTERONE PO        Dose:  1 tablet   Take 1 tablet by mouth daily   Refills:  0       Vitamin D (Cholecalciferol) 1000 UNITS Caps        Dose:  1000 Units   Take 1,000 Units by mouth daily   Refills:  0            Where to get your medicines      These medications were sent to [facility] [place], [place] - [address]  [address] [address]     Phone:  [phone]     acetaminophen 325 MG tablet    methocarbamol 750 MG tablet    senna-docusate 8.6-50 MG per tablet    warfarin 5 MG tablet         Some of these will need a paper prescription and others can be bought over the counter. Ask your nurse if you have questions.     Bring a paper prescription for each of these medications     order for DME    oxyCODONE IR 5 MG tablet                Protect others around you: Learn how to safely use, store and throw away your medicines at www.disposemymeds.org.        Information  about OPIOIDS     PRESCRIPTION OPIOIDS: WHAT YOU NEED TO KNOW    Prescription opioids can be used to help relieve moderate to severe pain and are often prescribed following a surgery or injury, or for certain health conditions. These medications can be an important part of treatment but also come with serious risks. It is important to work with your health care provider to make sure you are getting the safest, most effective care.    WHAT ARE THE RISKS AND SIDE EFFECTS OF OPIOID USE?  Prescription opioids carry serious risks of addiction and overdose, especially with prolonged use. An opioid overdose, often marked by slowed breathing can cause sudden death. The use of prescription opioids can have a number of side effects as well, even when taken as directed:      Tolerance - meaning you might need to take more of a medication for the same pain relief    Physical dependence - meaning you have symptoms of withdrawal when a medication is stopped    Increased sensitivity to pain    Constipation    Nausea, vomiting, and dry mouth    Sleepiness and dizziness    Confusion    Depression    Low levels of testosterone that can result in lower sex drive, energy, and strength    Itching and sweating    RISKS ARE GREATER WITH:    History of drug misuse, substance use disorder, or overdose    Mental health conditions (such as depression or anxiety)    Sleep apnea    Older age (65 years or older)    Pregnancy    Avoid alcohol while taking prescription opioids.   Also, unless specifically advised by your health care provider, medications to avoid include:    Benzodiazepines (such as Xanax or Valium)    Muscle relaxants (such as Soma or Flexeril)    Hypnotics (such as Ambien or Lunesta)    Other prescription opioids    KNOW YOUR OPTIONS:  Talk to your health care provider about ways to manage your pain that do not involve prescription opioids. Some of these options may actually work better and have fewer risks and side  effects:    Pain relievers such as acetaminophen, ibuprofen, and naproxen    Some medications that are also used for depression or seizures    Physical therapy and exercise    Cognitive behavioral therapy, a psychological, goal-directed approach, in which patients learn how to modify physical, behavioral, and emotional triggers of pain and stress    IF YOU ARE PRESCRIBED OPIOIDS FOR PAIN:    Never take opioids in greater amounts or more often than prescribed    Follow up with your primary health care provider and work together to create a plan on how to manage your pain.    Talk about ways to help manage your pain that do not involve prescription opioids    Talk about all concerns and side effects    Help prevent misuse and abuse    Never sell or share prescription opioids    Never use another person's prescription opioids    Store prescription opioids in a secure place and out of reach of others (this may include visitors, children, friends, and family)    Visit www.cdc.gov/drugoverdose to learn about risks of opioid abuse and overdose    If you believe you may be struggling with addiction, tell your health care provider and ask for guidance or call [facility]'s National Helpline at 8-746-115-HELP    LEARN MORE / www.cdc.gov/drugoverdose/prescribing/guideline.html    Safely dispose of unused prescription opioids: Find your local drug take-back programs and more information about the importance of safe disposal at www.doseofreality.[place].gov             Medication List: This is a list of all your medications and when to take them. Check marks below indicate your daily home schedule. Keep this list as a reference.      Medications           Morning Afternoon Evening Bedtime As Needed    acetaminophen 325 MG tablet   Commonly known as:  TYLENOL   Take 1-2 tablets every 4 hours as needed for pain.   Last time this was given:  975 mg on 3/30/2018  7:24 AM                                estradiol 0.05 MG/24HR BIW patch    Commonly known as:  VIVELLE-DOT   Place 1 patch onto the skin twice a week                                glucosamine-chondroitin 500-400 MG Caps per capsule   Take 1 capsule by mouth 2 times daily                                methocarbamol 750 MG tablet   Commonly known as:  ROBAXIN   Take 1 tablet (750 mg) by mouth 4 times daily as needed for muscle spasms                                multivitamin, therapeutic with minerals Tabs tablet   Take 1 tablet by mouth daily                                OMEGA-3 FISH OIL PO   Take 2 capsules by mouth 2 times daily                                order for DME   Equipment being ordered: [place] () Treatment Diagnosis: gait instablity                                oxyCODONE IR 5 MG tablet   Commonly known as:  ROXICODONE   Take 1-2 tablets (5-10 mg) by mouth every 4 hours as needed for other (pain control or improvement in physical function. Hold dose for analgesic side effects.)   Last time this was given:  5 mg on 3/30/2018 12:43 PM                                PROGESTERONE PO   Take 1 tablet by mouth daily                                senna-docusate 8.6-50 MG per tablet   Commonly known as:  SENOKOT-S;PERICOLACE   Take 1-2 tablets by mouth 2 times daily Hold for loose stools. Discontinue as able when narcotic use is discontinued.   Last time this was given:  2 tablets on 3/30/2018  8:35 AM                                Vitamin D (Cholecalciferol) 1000 UNITS Caps   Take 1,000 Units by mouth daily                                warfarin 5 MG tablet   Commonly known as:  COUMADIN   Take 1 tablet today, tomorrow and Sunday. INR Monday. Additional instructions per anticoagulation clinic.   Last time this was given:  5 mg on 3/29/2018  6:35 PM

## 2018-03-29 NOTE — PLAN OF CARE
Problem: Knee Arthroplasty (Total, Partial) (Adult)  Goal: Signs and Symptoms of Listed Potential Problems Will be Absent, Minimized or Managed (Knee Arthroplasty)  Signs and symptoms of listed potential problems will be absent, minimized or managed by discharge/transition of care (reference Knee Arthroplasty (Total, Partial) (Adult) CPG).   Pt. admitted from PACU at 1300 . Pt. accompanied by spouse and arrived with personal belongings. Report was taken from YADIRA Steiner in PACU. Pt. is A&Ox4. CAPNO is on and activated for safety. VSS. 02 sats= 100% on RA. Lung sounds= clear bilaterally with both anterior and posterior. Bowel sounds are active in all 4 quadrants. Last BM 3/28 per pt report, pt is passing gas. CMS and Neuro's are intact. Pt has numbness in perineal/buttock area and left foot. Pt had negligible pain this shift and pain was well managed with meds given in PACU, pt declined further interventions at this time. Pt given ICE for left knee. Pt. denied nausea, CP, SOB, lightheadedness, and dizziness. Is on a regular diet and appetite was good. Left knee incisional dressing is C/D/I. CPM set to 70 degrees flexion and 0 degrees extension. Hemovac drain is patent and had output of 150 mL of bloody/bright red drainage this shift. Pt up to bedside commode with assist of 1 and walker. Pt voided 550 mL and was bladder scanned for PVR of 12 mL. PIV is patent and infusing in left hand. Foot pumps are in place to BLEs. Pt. educated on use and purpose of the Incentive Spirometer. Pt. is oriented to the room and call light system and the call light is within reach. Continue to monitor.

## 2018-03-29 NOTE — OP NOTE
PREOPERATIVE DIAGNOSES:Left Knee Osteoarthritis,            Genu Varum            BMI 25           Mild Flexion Contracture           S/P previous ACL reconstrction    POSTOPERATIVE DIAGNOSES:  Same       PROCEDURES:     Total knee arthoplasty left  LRL (18mm  Removal of tibial stple        COMPONENTS USED:  [employer],   #5 CR femur,#4 tibia, 9mm CR  tibial insert, S 31x9mm patella       OPERATORS:  Pamela Richter MD     ASSISTANTS: Rafi Hayes MD; COURTNEY Palacios     ANESTHESIA:  Spinal Anesthesia supplemented with Brandy-articular Injection    Exam under anesthesia revealed range of motion 0/5/115      OPERATIVE FINDINGS: Grade 4 MFC, grade 4MTP, grade 1  LFC, grade 1 LTP, grade 2-3 patella, grade2-3 trochlea     DESCRIPTION OF PROCEDURE:  Under Spinal Anesthesia, the patient was positioned supine on the operating room table.    The patient's leg was prepped and draped in usual sterile fashion.  A pause was performed   identifying the correct leg and administration of antibiotics.      A tourniquet, which was on the upper aspect of the patient's leg was elevated   to 250 mmHg after Esmarch exsanguination of the leg.  A midline incision   was used to enter the knee.  A vastus splitting incision was used to enter the joint.    Findings as above.      The procedure began with soft tissue balancing by a  moderate release of the medial side down to but not including the distal  MCL.  The tibial staple was removed.    We used an intramedullary femoral guide set at 6 degrees of anatomic valgus and making a distal cut of 10 mm.    We then made  the appropriate anterior and posterior cuts to fit a #5 femur.      We then turned our attention to the tibia.  We used an extramedullary guide set at neutral  and made a minimal cut on the bone on the medial tibial side.  We used the tensioning device to evaluate balance between the   collaterals and between flexion and extension and felt that we  had good balance between the collaterals and between flexion and extension.    We made the appropriate  chamfer cuts on the femur to fit it for a CR femur.     We then returned to the tibia and fit the tibia to a #4 tibia base plate.      We then turned attention to the patella.  Total thickness was 19-22 mm.  We   removed bone to a 15 mm thickness and put an asymmetric patella button  for resulting thickness of 24 mm.  We did a minimal gerardo-retinacular release.  With all components in place, we did a trial  range of motion and felt we had good balance, with slight lateral tracking.  We then did a formal lengthening of the lateral tissues, for a resulting lengthening of 18mm.     We then removed the trial prosthesis, brought the permanent prosthesis in the room, pulse lavaged the knee with antibiotic irrigation.  Cemented all components at once in the following order; tibia, femur, patella.  Excess cement was removed both during and after the hardening process.  For the bulk of the hardening process, the leg was held in full extension with a trial insert in place.  Once we verified that the trial insert was the  correct size, we put the permanent prosthesis in place.      We then pulse lavaged the knee again with antibiotic irrigation.  A drain was placed deep to the wound.  The subvastus and superficial vastus fascia was  closed with a running #1 Vicryl, 2-0 Ethibond sutures followed by #1 Vicryl, closed the extensor mechanism proper.  Subcutaneous tissue was closed with 2-0 Vicryl.  The skin was closed with a running Monocryl. Pirneo wound closure system, sterile dressing and a Tubigrip stockinette was put in place.      Tourniquet was let down before closure.  Total tourniquet time was 121 minutes.  The patient tolerated the procedure well and was taken to the recovery room in satisfactory condition.     The patient will be maintained weightbearing as tolerated.  The patient will use a Active Ice and a CPM  postoperatively.     At three distinct times during the case the knee was injected with a pre-mixed cocktail of epimorphine, toradol, and ropivicaine.  This was done in the posterior knee before cementing, in the retinacular tissues while the cement was hardening, and in the subcutaneious space prior to closure.    COURTNEY Palacios was a necessary component of this case for tissue retraction and limb positioning.  There was a resdient learner available for the case, but a second set of hands was necessary  to  help manage the limb in the OR, help with tissue retraction to ensure maximum exposure and maximum surgical efficiency, both which promotes best practice and best surgical outcomes.  A PA was an essential part of this case.        ANTHONY RAMON MD          3/29/2018     Agatha Fung   MRN# [id number]                             YOB: 1955

## 2018-03-29 NOTE — IP AVS SNAPSHOT
UR 8A    [address]    [facility] [address]    Phone:  [phone]                                       After Visit Summary   3/29/2018    Agatha Fung    MRN: [id number]           After Visit Summary Signature Page     I have received my discharge instructions, and my questions have been answered. I have discussed any challenges I see with this plan with the nurse or doctor.    ..........................................................................................................................................  Patient/Patient Representative Signature      ..........................................................................................................................................  Patient Representative Print Name and Relationship to Patient    ..................................................               ................................................  Date                                            Time    ..........................................................................................................................................  Reviewed by Signature/Title    ...................................................              ..............................................  Date                                                            Time

## 2018-03-29 NOTE — ANESTHESIA CARE TRANSFER NOTE
Patient: Agatha Fung    Procedure(s):  Left Total Knee Arthroplasty and Left lateral retinacular lengthening - Wound Class: I-Clean    Diagnosis: Knee Osteoarthritis  Diagnosis Additional Information: No value filed.    Anesthesia Type:   Spinal, MAC     Note:  Airway :Room Air  Patient transferred to:PACU  Handoff Report: Identifed the Patient, Identified the Reponsible Provider, Reviewed the pertinent medical history, Discussed the surgical course, Reviewed Intra-OP anesthesia mangement and issues during anesthesia, Set expectations for post-procedure period and Allowed opportunity for questions and acknowledgement of understanding      Vitals: (Last set prior to Anesthesia Care Transfer)    CRNA VITALS  3/29/2018 1101 - 3/29/2018 1138      3/29/2018             Resp Rate (observed): 18                Electronically Signed By: Blessing Deleon CRNA, APRN CRNA  March 29, 2018  11:38 AM

## 2018-03-29 NOTE — OR NURSING
PACU to Inpatient Nursing Handoff    Patient Agatha Fung is a 62 year old female who speaks English.   Procedure Procedure(s):  Left Total Knee Arthroplasty; Left knee Hardware(Boothe staple) removal and Left lateral retinacular lengthening - Wound Class: I-Clean   Surgeon(s) Primary: Pamela Richter MD  Assisting: Bereket Villalta PA-C  Resident - Assisting: Rafi Johnson MD     No Known Allergies    Isolation  [unfilled]     Past Medical History   has a past medical history of PONV (postoperative nausea and vomiting).    Anesthesia Spinal   Dermatome Level     Preop Meds acetaminophen (Tylenol) - time given: 0628  celecoxib (Celebrex) - time given: 0628  gabapentin (Neurontin) - time given: 0628   Nerve block Not applicable   Intraop Meds fentanyl (Sublimaze): 50 mcg total  ondansetron (Zofran): last given at 0956   Local Meds Yes - Local Cocktail (morphine, ropivacaine, epinephrine, Toradol)   Antibiotics cefazolin (Ancef) - last given at 1050     Pain Patient Currently in Pain: yes  Comfort: comfortably manageable   PACU meds  oxycodone (Roxicodone): 5 mg (total dose) last given at 1205    PCA / epidural No   Capnography     Telemetry ECG Rhythm: Normal sinus rhythm   Inpatient Telemetry Monitor Ordered? No        Labs Glucose Lab Results   Component Value Date    GLC 99 03/29/2018       Hgb Lab Results   Component Value Date    HGB 14.0 03/29/2018       INR No results found for: INR   PACU Imaging Not applicable     Wound/Incision Incision/Surgical Site 03/29/18 Anterior;Left;Midline Knee (Active)   Incision Assessment [facility] 3/29/2018 11:35 AM   Closure Sutures;Liquid bandage 3/29/2018 10:46 AM   Dressing Intervention Clean, dry, intact 3/29/2018 11:35 AM   Number of days:0       Incision/Surgical Site Knee (Active)   Incision Assessment UTV 3/29/2018 11:35 AM   Dressing Intervention Clean, dry, intact 3/29/2018 11:35 AM   Number of days:      CMS Peripheral Neurovascular WDL:  WDL (03/29/18 1135)  LLE Temperature: warm (03/29/18 1200)  LLE Color: no discoloration (03/29/18 1200)  LLE Sensation: numbness present (03/29/18 1200)  RLE Temperature: warm (03/29/18 1135)  RLE Color: no discoloration (03/29/18 1135)  RLE Sensation: numbness present (03/29/18 1135)   Equipment ice pack and continuous passive motion machine (CPM)   Other LDA       IV Access Peripheral IV 03/29/18 Left Hand (Active)   Site Assessment WDL 3/29/2018 11:35 AM   Line Status Infusing 3/29/2018 11:35 AM   Phlebitis Scale 0-->no symptoms 3/29/2018 11:35 AM   Infiltration Scale 0 3/29/2018 11:35 AM   Number of days:0      Blood Products Not applicable EBL 40 mL   Intake/Output Date 03/29/18 0700 - 03/30/18 0659   Shift 1050-4358 6700-6619 0924-2248 24 Hour Total   I  N  T  A  K  E   I.V. 1100   1100    Shift Total  (mL/kg) 1100  (15.21)   1100  (15.21)   O  U  T  P  U  T   Urine 0   0    Blood 40   40    Shift Total  (mL/kg) 40  (0.55)   40  (0.55)   Weight (kg) 72.3 72.3 72.3 72.3        Drains / Rangel Closed/Suction Drain 1 Left Knee Accordion 10 Togolese (Active)   Site Description UTV 3/29/2018 11:35 AM   Dressing Status Normal: Clean, Dry & Intact 3/29/2018 11:35 AM   Drainage Appearance Bloody/Bright Red 3/29/2018 11:35 AM   Status Suction-low intermittent 3/29/2018 11:35 AM   Number of days:0      Time of void PreOp Void Prior to Procedure: 0600 (03/29/18 0616)    PostOp      Diapered? No   Bladder Scan  289   PO    tolerating sips and crackers     Vitals    B/P: 121/81  T: 97.9  F (36.6  C)    Temp src: Oral  P:  Pulse: 67 (03/29/18 0542)    Heart Rate: 78 (03/29/18 1200)     R: 9  O2:  SpO2: 97 %    O2 Device: None (Room air) (03/29/18 1200)              Family/support present significant other   Patient belongings Patient Belongings: clothing;shoes  Disposition of Belongings: Locker   Patient transported on bed   DC meds/scripts (obs/outpt) Not applicable   Inpatient Pain Meds Released? Yes       Special  needs/considerations None   Tasks needing completion None       Obdulia Combs, RN  [facility] [id number]

## 2018-03-29 NOTE — PROGRESS NOTES
"   03/29/18 1600   Values Beliefs and Spiritual Care   C: Community: In support of your spiritual health, is there someone we may contact for you? (identify all that apply) (On-Call/3-29)   Visit Information   Visit Made By Staff    Type of Visit Initial;On-call;Staff consultation/triage   Visited Patient   Interventions   Plan of Care Review   With patient/family/proxy   Basic Spiritual Interventions    introduction/orientation to Spiritual Health Services;Assessment of spiritual needs/resources;Life Review   Advanced Assessments/Interventions   Presenting Concerns/Issues Spiritual/Religion/emotional support   SPIRITUAL HEALTH SERVICES  [facility] ([facility]) 10A  ON-CALL VISIT     REFERRAL SOURCE: Hospital  Request.     Followed Agatha from OR to her room on 10A.  She was sitting up in chair, eating. She said, \"My leg is numb right now so I turned up the knee machine to 70.  I'll have to turn it down once the numbness wears off.\"  Agatha said that she scuba dives and looks forward to a River Cruise in [place] with her mom.  \"I am a  at [employer], and I love it! It's fun.\" My  is a veterinary medicine professor at [employer]. We have four kids between us. I have a son and a dtr.  My dtr is coming from [place], [place] to take care of me for a week, while my  goes to [place] to lecture. I am sad to have to miss [employer] [facility] in [place]. I sing in the choir and the [employer] service is wonderful.\" \"I am well supported and people at my [employer] are praying for me.\"     PLAN: Will let unit  know of visit on 10A.       Mine Dutton  Staff   Spiritual Health Services  Pgr: [phone]    * [facility] remains available 24/7 for emergent requests/referrals, either by having the switchboard page the on-call  or by entering an ASAP/STAT consult in Epic (this will also page the on-call ).*        "

## 2018-03-29 NOTE — IP AVS SNAPSHOT
"    UR 8A: [phone]                                              INTERAGENCY TRANSFER FORM - PHYSICIAN ORDERS   3/29/2018                    Hospital Admission Date: 3/29/2018  AGATHA ALEMAN   : 1955  Sex: Female        Attending Provider: Pamela Richter MD     Allergies:  No Known Allergies    Infection:  None   Service:  SURGERY    Ht:  1.702 m (5' 7\")   Wt:  72.3 kg (159 lb 6.3 oz)   Admission Wt:  72.3 kg (159 lb 6.3 oz)    BMI:  24.96 kg/m 2   BSA:  1.85 m 2            Patient PCP Information     Provider PCP Type    Agatha Ashley [facility]      ED Clinical Impression     Diagnosis Description Comment Added By Time Added    Status post total knee replacement, unspecified laterality [Z96.659] Status post total knee replacement, unspecified laterality [Z96.659]  Yvette Cook APRN CNP 3/30/2018  9:59 AM      Hospital Problems as of 3/30/2018              Priority Class Noted POA    Total knee replacement status Medium  3/29/2018 Yes      Non-Hospital Problems as of 3/30/2018     None      Code Status History     Date Active Date Inactive Code Status Order ID Comments User Context    This patient has a current code status but no historical code status.         Medication Review      START taking        Dose / Directions Comments    acetaminophen 325 MG tablet   Commonly known as:  TYLENOL        Take 1-2 tablets every 4 hours as needed for pain.   Quantity:  100 tablet   Refills:  0        methocarbamol 750 MG tablet   Commonly known as:  ROBAXIN        Dose:  750 mg   Take 1 tablet (750 mg) by mouth 4 times daily as needed for muscle spasms   Quantity:  35 tablet   Refills:  0        order for DME        Equipment being ordered: Crutches () Treatment Diagnosis: gait instablity   Quantity:  1 each   Refills:  0        oxyCODONE IR 5 MG tablet   Commonly known as:  ROXICODONE        Dose:  5-10 mg   Take 1-2 tablets (5-10 mg) by mouth every 4 hours as needed for other " (pain control or improvement in physical function. Hold dose for analgesic side effects.)   Quantity:  60 tablet   Refills:  0        senna-docusate 8.6-50 MG per tablet   Commonly known as:  SENOKOT-S;PERICOLACE        Dose:  1-2 tablet   Take 1-2 tablets by mouth 2 times daily Hold for loose stools. Discontinue as able when narcotic use is discontinued.   Quantity:  50 tablet   Refills:  0        warfarin 5 MG tablet   Commonly known as:  COUMADIN        Take 1 tablet today, tomorrow and Sunday. INR Monday. Additional instructions per anticoagulation clinic.   Quantity:  45 tablet   Refills:  0          CONTINUE these medications which have NOT CHANGED        Dose / Directions Comments    estradiol 0.05 MG/24HR BIW patch   Commonly known as:  VIVELLE-DOT        Dose:  1 patch   Place 1 patch onto the skin twice a week   Refills:  0        glucosamine-chondroitin 500-400 MG Caps per capsule        Dose:  1 capsule   Take 1 capsule by mouth 2 times daily   Refills:  0        multivitamin, therapeutic with minerals Tabs tablet        Dose:  1 tablet   Take 1 tablet by mouth daily   Refills:  0        OMEGA-3 FISH OIL PO        Dose:  2 capsule   Take 2 capsules by mouth 2 times daily   Refills:  0        PROGESTERONE PO        Dose:  1 tablet   Take 1 tablet by mouth daily   Refills:  0        Vitamin D (Cholecalciferol) 1000 UNITS Caps        Dose:  1000 Units   Take 1,000 Units by mouth daily   Refills:  0                Summary of Visit     Reason for your hospital stay       You had knee surgery             After Care     Activity       Your activity upon discharge: as tolerated. Ice and elevate your knee when resting to decrease swelling.       Discharge Instructions       TOTAL KNEE ARTHROPLASTY POST OPERATIVE INSTRUCTIONS     COMFORT:  Elevation: Elevate your knee and ankle above the level of your heart. The best position is lying down with two pillows lengthwise under your entire leg. Do not place pillows  under your knee. This should be done for the first several days after surgery.  Ice: An ice pack will be provided to control swelling and discomfort. You may apply the ice pack over the dressing for up to 20 minutes every hour for the first 48 hours after surgery. After this period, you may use the ice pack as needed. Always place a thin towel around the ice pack to prevent direct contact with the skin.   Pain medication: Take medication as prescribed, but only as often as necessary.  Do not drive a motor vehicle, operate machinery or appliances, make important decisions, sign legal documents, or drink alcohol while using narcotic pain medications. Remember that acetaminophen (Tylenol) can also be used for pain relief, and the maximum Tylenol dose for a single day is 3000 mg.            ACTIVITIES:  Exercises: Point and flex your feet and wiggle your toes, move your ankle around in a [place]  to help prevent complications such as blood clotting in your legs. Quad muscle isometric and short arc exercises should begin the day of surgery and should be done for 10 to 15 minutes, 3 times a day for the first 2 to 3 weeks after surgery. Focus on obtaining full hyperextension to 90 degrees knee flexion.  CPM: CPM should be set at 0 degrees to maximum flexion tolerance. Advance aggressively in increments of 5 degrees toward goal of 90 degrees flexion.   Weight bearing status: You are allowed to put weight on your operative leg (weight bearing as tolerated) but may it be limited due to pain. Walk using two crutches. Duration of crutch use is typically 1-2 weeks for indoor movement and 2-4 weeks for outdoor activity.   Physical Therapy: A prescription and protocol sheet for physical therapy will be sent home with you and must be taken to your first therapy visit.  Driving: Driving is NOT permitted until allowed by your provider. Generally, this is 3 to 4 weeks following right knee surgery and 2 to 3 weeks following left knee  surgery.  Athletic activities: Athletic activities such as swimming, bicycling, jogging, running and stop-and-go-sports should be avoided until allowed by your provider.   Return to work: You may return to work once allowed by your provider. Generally, patients with desk jobs may return to work in 3 to 4 days. Patients with heavy labor jobs usually can return to work in 3 months.    DIET:   Resume your regular diet as soon as tolerated. Try to increase your fluids, fibers, fruits, and vegetables to prevent constipation from pain medications.  WOUND CARES:   Dressing: Keep the initial post-op dressing clean, and dry for the first 3 days after surgery. You may then remove the dressing to shower. After showering, please apply a fresh dressing to the wound using 4x4s and tape. Continue these dressing changes until postoperative day 14.  Incision: The incision was closed with a buried absorbable running stitch, and a Dermabond PRINEO Skin Closure System  (clear mesh tape) was applied overtop. The clear mesh tape can be removed around postoperative day 14. The best way to do this is to gently grasp the mesh tape at one end of the wound and slowly peel it away from the skin along the line of the wound. Take care when peeling the mesh to ensure that you are applying tension to the mesh in a direction that is parallel, and not perpendicular, to the skin surface. It is possible that the tape may begin to lift from the skin surface before postoperative day 14. If this occurs, you may trim any free edges that are no longer adhered to the skin surface. To do this, grasp the free mesh, apply gentle tension, and trim with a scissors.   Bathing: You may shower on postoperative day 3. Tub bathing, swimming, and soaking of the incision should be avoided until allowed by your provider.  CALL YOUR PROVIDER IMMEDIATELY IF:  Pain in your knee becomes more severe or uncontrollable after surgery.  You experience excessive redness, warmth,  "or drainage from the operative site. Although clear red tinted fluid and some mild drainage should be expected following surgery, drainage of any kind after 5 days should be reported to your provider.  You have a temperature elevation greater than 101.5 .   You have pain, swelling or redness in your calf.  You have numbness or weakness in your leg or foot.  You have persistent nausea or vomiting.    You may contact your physician at [phone] during business hours.  For after hours or weekend calls, you may contact the hospital at [phone] and ask for the on-call orthopedic resident  florinda       Wound care and dressings       Instructions to care for your wound at home:             Referrals     INR Clinic Referral       INR Clinic Referral     INR and warfarin monitoring: Goal 2-2.5  Indication for anticoagulation: Orthopedic DVT prophylaxis  Duration: 4 weeks  MD following:Dr. Pamela Richter  Next INR lab draw: 4/2/2018        Please call or fax results to:  [facility]   Phone: [phone]   Fax: [phone]       Physical Therapy Referral       [facility]  [address]  [place] [address]  Clinic phone: [phone]  Clinic fax: [phone]  Appointments: [phone]     Treatment: Evaluation & Treatment  Special Instructions/Modalities: None  Special Programs: None    Please be aware that coverage of these services is subject to the terms and limitations of your health insurance plan.  Call member services at your health plan with any benefit or coverage questions.      **Note to Provider:  If you are referring outside of [place] for the therapy appointment, please list the name of the location in the \"special instructions\" above, print the referral and give to the patient to schedule the appointment.    Future appointments:    Monday, April 2 at 2:25pm with Marion Friday, April 6 at " 10:00am with Ronald  Tuesday, April 10 at 3:00pm with Ronald  Thursday, April 12 at 1:00pm with Ronald             Your next 10 appointments already scheduled     Apr 02, 2018  2:40 PM CDT   (Arrive by 2:25 PM)   TAINA Extremity with Yane Elena, PT   [facility] ([facility]  )    [address]  [facility] [address]   [phone]            Apr 06, 2018 10:00 AM CDT   TAINA Extremity with Ishaan Beavers Pt, PT   [facility] ([facility]  )    [address]  [facility] [address]   [phone]            Apr 10, 2018  3:00 PM CDT   TAINA Extremity with Ishaan Beavers Pt, PT   [facility] ([facility]  )    [address]  [facility] [address]   [phone]            Apr 12, 2018  1:00 PM CDT   TAINA Extremity with Ishaan Beavers Pt, PT   [facility] ([facility]  )    [address]  [facility] [address]   [phone]              Follow-Up Appointment Instructions     Future Labs/Procedures    Follow Up and recommended labs and tests     Comments:    INR Monday.      Follow-Up Appointment Instructions     Follow Up and recommended labs and tests       INR Monday.             Statement of Approval     Ordered          03/30/18 1253  I have reviewed and agree with all the recommendations and orders detailed in this document.  EFFECTIVE NOW     Approved and electronically signed by:  Yvette Cook APRN CNP

## 2018-03-29 NOTE — ANESTHESIA PROCEDURE NOTES
Spinal/LP Procedure Note    Spinal Block  Staff:     Anesthesiologist:  MONSERRAT DONIS  Location: In OR BEFORE Induction  Procedure Start/Stop Times:      3/29/2018 8:12 AM    patient identified, IV checked, site marked, risks and benefits discussed, informed consent, monitors and equipment checked, pre-op evaluation, at physician/surgeon's request and post-op pain management      Correct Patient: Yes      Correct Position: Yes      Correct Site: Yes      Correct Procedure: Yes      Correct Laterality:  Yes    Site Marked:  Yes  Procedure:     Procedure:  Intrathecal    ASA:  1    Diagnosis:  L knee OA    Position:  Sitting    Sterile Prep: chloraprep      Insertion site:  L2-3    Approach:  Midline    Needle Type:  Quincke    Needle gauge (G):  27    Local Skin Infiltration:  1% lidocaine    amount (ml):  2    Needle Length (in):  3.5    Introducer used: No      Attempts:  2    Redirects:  0    CSF:  Clear    Paresthesias:  No    Time injected:  08:16

## 2018-03-29 NOTE — PLAN OF CARE
Problem: Patient Care Overview  Goal: Plan of Care/Patient Progress Review  Discharge Planner PT   Patient plan for discharge: Home with spouse/family for assist.   Current status: PT evaluation completed.  Sit to/from standing from varying surfaces with FWW and CGA to SBA x 1.  Pt ambulated 300' with FWW and CGA x  1.  Pt tolerated seated exercises well and knee ROM 0-7-96 degrees.  Sitting to supine with HOB elevated and SBA x 1.    Barriers to return to prior living situation: No barriers to discharge home.   Recommendations for discharge: Home with family for assist and OP PT  Rationale for recommendations: Pt would benefit from further skilled PT for LE strengthening/ROM and gait training.        Entered by: Dorothy Vargas 03/29/2018 5:10 PM

## 2018-03-30 ENCOUNTER — APPOINTMENT (OUTPATIENT)
Dept: PHYSICAL THERAPY | Facility: CLINIC | Age: 63
DRG: 470 | End: 2018-03-30
Attending: ORTHOPAEDIC SURGERY
Payer: COMMERCIAL

## 2018-03-30 ENCOUNTER — ANTICOAGULATION THERAPY VISIT (OUTPATIENT)
Dept: ANTICOAGULATION | Facility: CLINIC | Age: 63
End: 2018-03-30

## 2018-03-30 ENCOUNTER — APPOINTMENT (OUTPATIENT)
Dept: OCCUPATIONAL THERAPY | Facility: CLINIC | Age: 63
DRG: 470 | End: 2018-03-30
Attending: ORTHOPAEDIC SURGERY
Payer: COMMERCIAL

## 2018-03-30 ENCOUNTER — APPOINTMENT (OUTPATIENT)
Dept: GENERAL RADIOLOGY | Facility: CLINIC | Age: 63
DRG: 470 | End: 2018-03-30
Attending: ORTHOPAEDIC SURGERY
Payer: COMMERCIAL

## 2018-03-30 VITALS
SYSTOLIC BLOOD PRESSURE: 119 MMHG | BODY MASS INDEX: 25.02 KG/M2 | OXYGEN SATURATION: 99 % | RESPIRATION RATE: 16 BRPM | HEART RATE: 66 BPM | TEMPERATURE: 98.5 F | DIASTOLIC BLOOD PRESSURE: 65 MMHG | WEIGHT: 159.39 LBS | HEIGHT: 67 IN

## 2018-03-30 DIAGNOSIS — Z47.1 AFTERCARE FOLLOWING LEFT KNEE JOINT REPLACEMENT SURGERY: ICD-10-CM

## 2018-03-30 DIAGNOSIS — Z96.652 AFTERCARE FOLLOWING LEFT KNEE JOINT REPLACEMENT SURGERY: ICD-10-CM

## 2018-03-30 LAB
GLUCOSE SERPL-MCNC: 116 MG/DL (ref 70–99)
HGB BLD-MCNC: 11.6 G/DL (ref 11.7–15.7)
INR PPP: 1.21 (ref 0.86–1.14)

## 2018-03-30 PROCEDURE — 36415 COLL VENOUS BLD VENIPUNCTURE: CPT | Performed by: ORTHOPAEDIC SURGERY

## 2018-03-30 PROCEDURE — 82947 ASSAY GLUCOSE BLOOD QUANT: CPT | Performed by: ORTHOPAEDIC SURGERY

## 2018-03-30 PROCEDURE — 97116 GAIT TRAINING THERAPY: CPT | Mod: GP

## 2018-03-30 PROCEDURE — 27210995 ZZH RX 272: Performed by: ORTHOPAEDIC SURGERY

## 2018-03-30 PROCEDURE — 97165 OT EVAL LOW COMPLEX 30 MIN: CPT | Mod: GO | Performed by: OCCUPATIONAL THERAPIST

## 2018-03-30 PROCEDURE — 25000128 H RX IP 250 OP 636: Performed by: ORTHOPAEDIC SURGERY

## 2018-03-30 PROCEDURE — 85018 HEMOGLOBIN: CPT | Performed by: ORTHOPAEDIC SURGERY

## 2018-03-30 PROCEDURE — 97110 THERAPEUTIC EXERCISES: CPT | Mod: GP

## 2018-03-30 PROCEDURE — 40000193 ZZH STATISTIC PT WARD VISIT

## 2018-03-30 PROCEDURE — 97116 GAIT TRAINING THERAPY: CPT | Mod: GP | Performed by: PHYSICAL THERAPIST

## 2018-03-30 PROCEDURE — 40000193 ZZH STATISTIC PT WARD VISIT: Performed by: PHYSICAL THERAPIST

## 2018-03-30 PROCEDURE — 73560 X-RAY EXAM OF KNEE 1 OR 2: CPT | Mod: LT

## 2018-03-30 PROCEDURE — 97535 SELF CARE MNGMENT TRAINING: CPT | Mod: GO | Performed by: OCCUPATIONAL THERAPIST

## 2018-03-30 PROCEDURE — 25000132 ZZH RX MED GY IP 250 OP 250 PS 637: Performed by: ORTHOPAEDIC SURGERY

## 2018-03-30 PROCEDURE — 85610 PROTHROMBIN TIME: CPT | Performed by: ORTHOPAEDIC SURGERY

## 2018-03-30 PROCEDURE — 40000133 ZZH STATISTIC OT WARD VISIT: Performed by: OCCUPATIONAL THERAPIST

## 2018-03-30 PROCEDURE — 97530 THERAPEUTIC ACTIVITIES: CPT | Mod: GP

## 2018-03-30 RX ORDER — OXYCODONE HYDROCHLORIDE 5 MG/1
5-10 TABLET ORAL EVERY 4 HOURS PRN
Qty: 60 TABLET | Refills: 0 | Status: SHIPPED | OUTPATIENT
Start: 2018-03-30 | End: 2019-08-06

## 2018-03-30 RX ORDER — ACETAMINOPHEN 325 MG/1
TABLET ORAL
Qty: 100 TABLET | Refills: 0 | Status: SHIPPED | OUTPATIENT
Start: 2018-03-30

## 2018-03-30 RX ORDER — WARFARIN SODIUM 5 MG/1
5 TABLET ORAL
Status: DISCONTINUED | OUTPATIENT
Start: 2018-03-30 | End: 2018-03-30 | Stop reason: HOSPADM

## 2018-03-30 RX ORDER — METHOCARBAMOL 750 MG/1
750 TABLET, FILM COATED ORAL 4 TIMES DAILY PRN
Qty: 35 TABLET | Refills: 0 | Status: SHIPPED | OUTPATIENT
Start: 2018-03-30 | End: 2019-08-06

## 2018-03-30 RX ORDER — AMOXICILLIN 250 MG
1-2 CAPSULE ORAL 2 TIMES DAILY
Qty: 50 TABLET | Refills: 0 | Status: SHIPPED | OUTPATIENT
Start: 2018-03-30 | End: 2019-08-06

## 2018-03-30 RX ORDER — WARFARIN SODIUM 5 MG/1
TABLET ORAL
Qty: 45 TABLET | Refills: 0 | Status: SHIPPED | OUTPATIENT
Start: 2018-03-30 | End: 2019-08-06

## 2018-03-30 RX ADMIN — OXYCODONE HYDROCHLORIDE 5 MG: 5 TABLET ORAL at 02:43

## 2018-03-30 RX ADMIN — SENNOSIDES AND DOCUSATE SODIUM 2 TABLET: 8.6; 5 TABLET ORAL at 08:35

## 2018-03-30 RX ADMIN — WATER 1 G: 100 INJECTION, SOLUTION INTRAVENOUS at 02:45

## 2018-03-30 RX ADMIN — OXYCODONE HYDROCHLORIDE 10 MG: 5 TABLET ORAL at 07:24

## 2018-03-30 RX ADMIN — ACETAMINOPHEN 975 MG: 325 TABLET ORAL at 07:24

## 2018-03-30 RX ADMIN — OXYCODONE HYDROCHLORIDE 5 MG: 5 TABLET ORAL at 04:14

## 2018-03-30 RX ADMIN — OXYCODONE HYDROCHLORIDE 5 MG: 5 TABLET ORAL at 12:43

## 2018-03-30 NOTE — PROGRESS NOTES
03/30/18 1020   Quick Adds   Type of Visit Initial Occupational Therapy Evaluation   Living Environment   Lives With spouse   Living Arrangements house   Home Accessibility stairs to enter home;stairs within home   Number of Stairs to Enter Home 3   Number of Stairs Within Home 12   Transportation Available car;family or friend will provide   Living Environment Comment Pt's daughter is coming to stay with pt 24/7 upon discharge   Self-Care   Dominant Hand right   Usual Activity Tolerance excellent   Current Activity Tolerance moderate   Regular Exercise (elliptical, walking, kayaking, OP PT)   Equipment Currently Used at Home none   Activity/Exercise/Self-Care Comment Pt reported she has been going to OP PT for tennis elbow 1x/week; reports her knee has not slowed down her activity level   Functional Level Prior   Ambulation 0-->independent   Transferring 0-->independent   Toileting 0-->independent   Bathing 0-->independent   Dressing 0-->independent   Eating 0-->independent   Communication 0-->understands/communicates without difficulty   Swallowing 0-->swallows foods/liquids without difficulty   Cognition 0 - no cognition issues reported   Fall history within last six months no   Which of the above functional risks had a recent onset or change? ambulation;transferring;toileting;bathing;dressing   Prior Functional Level Comment Pt was (I) with ADL/IADLs including driving and full time work as a  (combination of desk work and traveling)   General Information   Onset of Illness/Injury or Date of Surgery - Date 03/29/18   Referring Physician Rafi Johnson MD   Patient/Family Goals Statement to go home today   Additional Occupational Profile Info/Pertinent History of Current Problem Pt is s/p left TKA, see chart for PMH   Precautions/Limitations no known precautions/limitations   Weight-Bearing Status - LUE full weight-bearing   Weight-Bearing Status - RUE full weight-bearing   Weight-Bearing  Status - LLE full weight-bearing   Weight-Bearing Status - RLE full weight-bearing   Cognitive Status Examination   Orientation orientation to person, place and time   Level of Consciousness alert   Able to Follow Commands WNL/WFL   Personal Safety (Cognitive) WNL/WFL   Memory intact   Visual Perception   Visual Perception Wears glasses  (for reading; no new concerns)   Sensory Examination   Sensory Quick Adds Other (describe)   Sensory Comments CTS in B hands; pt's has Raynaud's   Pain Assessment   Patient Currently in Pain (well managed)   Range of Motion (ROM)   ROM Comment Param DAMICO; has tennis elbow in E and has been working with PT for this   Strength   Strength Comments Param DAMICO   Mobility   Bed Mobility Bed mobility skill: Supine to sit;Bed mobility skill: Sit to supine;Bed mobility skill: Scooting/Bridging   Bed Mobility Skill: Scooting/Bridging   Level of [place]: Scooting/Bridging independent   Bed Mobility Skill: Sit to Supine   Level of [place]: Sit/Supine independent   Bed Mobility Skill: Supine to Sit   Level of [place]: Supine/Sit independent   Transfer Skill: Sit to Stand   Level of [place]: Sit/Stand independent   Activities of Daily Living Analysis   Impairments Contributing to Impaired Activities of Daily Living balance impaired;pain   General Therapy Interventions   Planned Therapy Interventions ADL retraining;IADL retraining;bed mobility training;ROM;strengthening;stretching;transfer training;home program guidelines;progressive activity/exercise   Clinical Impression   Criteria for Skilled Therapeutic Interventions Met yes, treatment indicated   OT Diagnosis decreased ADL/IADL (I)   Influenced by the following impairments pain, impaired balance   Assessment of Occupational Performance 3-5 Performance Deficits   Identified Performance Deficits functional transfers, toileting, bathing, dressing   Clinical Decision Making (Complexity) Low complexity   Therapy Frequency daily  "  Predicted Duration of Therapy Intervention (days/wks) eval + 1 tx   Anticipated Equipment Needs at Discharge (possibly shower chair)   Anticipated Discharge Disposition Home with Assist   Risks and Benefits of Treatment have been explained. Yes   Patient, Family & other staff in agreement with plan of care Yes   [facility] TM \"6 Clicks\"   2016, Trustees of [facility], under license to [employer].  All rights reserved.   6 Clicks Short Forms Daily Activity Inpatient Short Form   [facility]  \"6 Clicks\" Daily Activity Inpatient Short Form   1. Putting on and taking off regular lower body clothing? 3 - A Little   2. Bathing (including washing, rinsing, drying)? 3 - A Little   3. Toileting, which includes using toilet, bedpan or urinal? 4 - None   4. Putting on and taking off regular upper body clothing? 4 - None   5. Taking care of personal grooming such as brushing teeth? 4 - None   6. Eating meals? 4 - None   Daily Activity Raw Score (Score out of 24.Lower scores equate to lower levels of function) 22   Total Evaluation Time   Total Evaluation Time (Minutes) 8     "

## 2018-03-30 NOTE — PLAN OF CARE
Problem: Patient Care Overview  Goal: Plan of Care/Patient Progress Review  Outcome: Improving    VS: stable   O2: Room air   Output: Voiding well using the bathroom   Last BM:    Activity: Walking in the hawk x 2, doing well   Skin: Unable to see incision area   Pain: Well manageable with oxycodone   CMS: intact   Dressing: CDI   Diet: regular   LDA: PIV   Equipment: Pt is using walker   Plan: unknown   Additional Info:

## 2018-03-30 NOTE — PLAN OF CARE
Problem: Knee Arthroplasty (Total, Partial) (Adult)  Goal: Signs and Symptoms of Listed Potential Problems Will be Absent, Minimized or Managed (Knee Arthroplasty)  Signs and symptoms of listed potential problems will be absent, minimized or managed by discharge/transition of care (reference Knee Arthroplasty (Total, Partial) (Adult) CPG).   Outcome: Adequate for Discharge Date Met: 03/30/18    VS: Stable. Orthostatic hypotension in a.m.  And was resolved by lunch time by decreasing narcotics.    O2: 96% on RA    Output: Voiding adequate amts in bathroom.    Last BM: 3/28   Activity: Ambulates with crutched and SBA   Skin: WDL with exception to incision.    Pain: Well controlled with oxycodone 5mg    CMS: Intact    Dressing: CDI and changed by ortho   Diet: Tolerating regular diet    LDA: PIV SL    Equipment: Crutches    Plan: DC home today    Additional Info:     Pt. discharged at 1515 to home. Pt. was accompanied by , and left with personal belongings. Prior to discharge, PIV was removed. Pt. received complete discharge paperwork and  medications as filled by discharge pharmacy. Pt. was given times of last dose for all discharge medications in writing on discharge medication sheets.  Discharge teaching included  medication, pain management, activity restrictions, CPM, dressing changes, and signs and symptoms of infection. Pt. had no further questions at the time of discharge and no unmet needs were identified.

## 2018-03-30 NOTE — PLAN OF CARE
Problem: Patient Care Overview  Goal: Plan of Care/Patient Progress Review  Discharge Planner OT   Patient plan for discharge: Home with assist  Current status: OT eval and tx completed. Pt able to demonstrate/verbalize appropriate techniques for achieving (I) with LE dressing and toileting after education provided. Pt also demonstrated walk in shower transfer simulated to home set-up; educated on options for AE if she desires to obtain and pt verbalized understanding. Pt with no further OT related concerns, all OT goals met.  Barriers to return to prior living situation: none  Recommendations for discharge: Home with assist  Rationale for recommendations: see above       Entered by: Oliva Boyd 03/30/2018 10:58 AM     Occupational Therapy Discharge Summary    Reason for therapy discharge:    Discharged to home.    Progress towards therapy goal(s). See goals on Care Plan in [facility] electronic health record for goal details.  Goals met    Therapy recommendation(s):    No further therapy is recommended.

## 2018-03-30 NOTE — PROGRESS NOTES
NOTE:  New referral for this pt after L TKA on 3/29.  She remains hospitalized - we will follow up after DC from the hospital.  Pt added to our hospitalized list.  Current plan is for INR on 4/2  Day MAY

## 2018-03-30 NOTE — PLAN OF CARE
Problem: Patient Care Overview  Goal: Plan of Care/Patient Progress Review  Discharge Planner PT   Patient plan for discharge: Home with OP PT.  Current status: Pt demonstrated mod I with transfers with axillary crutches.  Pt ambulated 250' and 50' with axillary crutches and mod I. Pt demonstrated correct stair sequence with 1 railing, 1 crutch, and then with 2 crutches and no railing.  Pt had no c/o dizziness with OOB activity this PM.   Barriers to return to prior living situation: No barriers to discharge home.   Recommendations for discharge: Home with OP PT  Rationale for recommendations: Pt would benefit from further skilled PT for LE strengthening/ROM and gait training.        Entered by: Dorothy Vargas 03/30/2018 1:43 PM         Physical Therapy Discharge Summary    Reason for therapy discharge:    Discharged to home with outpatient therapy.    Progress towards therapy goal(s). See goals on Care Plan in [facility] electronic health record for goal details.  Goals met    Therapy recommendation(s):    Continued therapy is recommended.  Rationale/Recommendations:  See above.

## 2018-03-30 NOTE — PLAN OF CARE
Problem: Patient Care Overview  Goal: Plan of Care/Patient Progress Review  Discharge Planner PT   Patient plan for discharge: home with Ax1 for all mobility from /daughter.    Current status: Amb 200ft IND with 2 point gait pattern with crutches.  Ascends and descends 9 steps with single railing SBA-IND.  Following stairs, pt gets very dizzy with positive OH: 86/44.  HEP performed and handout given.    Barriers to return to prior living situation: dizziness with mobility   Recommendations for discharge: home with Ax1 for all mobility pending symptoms with mobility. OP PT  Rationale for recommendations: Pt SBA-IND with all mobility however at end of session experiences OH with dizziness limiting safe mobility.  Will need one more PT session to ensure safety.  OP PT in order to increase ROM, functional strength and stability.        Entered by: Tony Frausto 03/30/2018 10:19 AM

## 2018-03-30 NOTE — PROGRESS NOTES
Care Coordinator- Discharge Planning     Admission Date/Time:  3/29/2018  Attending MD:  Pamela Richter MD     Data  Date of initial CC assessment:  3/30/2018  Chart reviewed, discussed with interdisciplinary team.   Patient was admitted for:   1. Status post total knee replacement, unspecified laterality         Assessment  Concerns with insurance coverage for discharge needs: None.  Current Living Situation: Patient lives with spouse.  Support System: Supportive and Involved  Services Involved: Outpatient Rehab  Transportation: Family or Friend will provide  Barriers to Discharge: None      Coordination of Care and Referrals: Provided patient/family with options for Outpatient Rehab. Met with patient at bedside to discuss discharge planning. A referral for physical therapy was sent to the [facility]. No further discharge concerns at this time. All therapy orders completed. CC to follow as needed.    [facility] ([facility]3]  [address]  [place] [address]  Clinic phone: [phone]  Clinic fax: [phone]  Appointments: [phone]     Treatment: Evaluation & Treatment  Special Instructions/Modalities: None  Special Programs: None    Future appointments:    Monday, April 2 at 2:25pm with Merlene  Friday, April 6 at 10:00am with Ronald  Tuesday, April 10 at 3:00pm with Ronald  Thursday, April 12 at 1:00pm with Ronald    Update: INR lab draw orders faxed to [facility], phone [phone], fax [phone].      Plan  Anticipated Discharge Date:  3/30/2018  Anticipated Discharge Plan:  Home with outpatient PT    CTS Handoff completed:  YES    Yasmin Weeks RN, BSN  Care Coordinator, 8A  Phone [phone]  Pager [phone]

## 2018-03-30 NOTE — PLAN OF CARE
Problem: Patient Care Overview  Goal: Plan of Care/Patient Progress Review  Pt. A&Ox4. VSS. Afebrile. Lung sounds CTA. Maintaining sats on RA. IS encouraged. Bowel sounds active, LBM 3/28, flatus +. PP+ DP+. CMS and neuro's are intact. Denies numbness and tingling in all extremities. Denies nausea, shortness of breath, and chest pain. LLE pain managed w/ scheduled Tylenol, prn oxycodone 5mg overnight and 10mg this am, and ice maintained. Voids spontaneously without difficulty in the BR. Tolerating regular diet. LLE incisional dressing is CDI. CPM in use all night w/ exception of 1hr break, was using 0-70 decreased to 0-50 care home through night d/t pain. Pt up with SBA. PIV is patent and SL. PCD's on BLE's. Bilateral heels are elevated off the bed. Call light is within reach, pt able to make needs known. Will continue to monitor.

## 2018-03-30 NOTE — PROGRESS NOTES
"Ortho Progress Note     S: No acute overnight events. Pain controlled. Denies n/v/f/c.     O:  /65  Pulse 66  Temp 98.1  F (36.7  C) (Oral)  Resp 18  Ht 5' 7\" (1.702 m)  Wt 159 lb 6.3 oz (72.3 kg)  SpO2 96%  BMI 24.96 kg/m2  Gen: A&Ox3, no acute distress  CV: 2+ dp/pt pulses, capillary refill < 2sec  Resp: breathing equal and non-labored, no wheezing  Extremities: external dressing dry. Drain out. Fires ehl, fhl, ta, gsc.     Hemoglobin   Date Value Ref Range Status   03/29/2018 14.0 11.7 - 15.7 g/dL Final     No components found for: PLATELETS  No results found for: HCT  No results found for: WBC  No results found for: CRP      Your basic metabolic panel results:  No results found for: NA    (Sodium/Salt)  No results found for: POTASSIUM  Glucose   Date Value Ref Range Status   03/29/2018 99 70 - 99 mg/dL Final       (Glucose/Blood Sugar/Diabetic Screen)  No results found for: FUAD    (Calcium)  No results found for: CR    (Kidney Function)            A/P: s/p left tka on 3/29    - wbat  - cpm per routine  - warfarin for dvt  - abx per routine      Pending pt, anticipate dc to home today vs. Tomorrow    Rafi Johnson MD  PGY-4, Orthopaedic Surgery  [phone]      "

## 2018-03-30 NOTE — MR AVS SNAPSHOT
Agatha Fung   3/30/2018   Anticoagulation Therapy Visit    Description:  62 year old female   Provider:  Jayla Younger, RN   Department:  [facility]           INR as of 3/30/2018     Today's INR       Anticoagulation Summary as of 3/30/2018     INR goal 2.0-2.5   Today's INR    Next INR check     Indications   Aftercare following joint replacement [Z47.1] [Z47.1]

## 2018-03-30 NOTE — PLAN OF CARE
Problem: Knee Arthroplasty (Total, Partial) (Adult)  Goal: Signs and Symptoms of Listed Potential Problems Will be Absent, Minimized or Managed (Knee Arthroplasty)  Signs and symptoms of listed potential problems will be absent, minimized or managed by discharge/transition of care (reference Knee Arthroplasty (Total, Partial) (Adult) CPG).   Outcome: No Change  Post op TKA left  Knee dressing dry and intact with Hemovac with bloody drainage. Block is wearing off.Pain controlled with oral pain meds.Pt up in chair and walked in hawk with walker and assist of one. Using CPM at 70 degrees flexion. Denies CP/SOB. Vitals grafted. IV infusing. Voiding.  P: Continue to monitor

## 2018-03-31 NOTE — DISCHARGE SUMMARY
ORTHOPAEDIC DISCHARGE SUMMARY     Date of Admission: 3/29/2018  Date of Discharge: 3/30/2018  3:15 PM  Disposition: Home  Staff Physician: No att. providers found  Primary Care Provider: Agatha Triana    DISCHARGE DIAGNOSIS:  Knee Osteoarthritis    PROCEDURES: Procedure(s):  ARTHROPLASTY KNEE  REMOVE HARDWARE KNEE on 3/29/2018    BRIEF HISTORY:  Hx of OA and knee pain    HOSPITAL COURSE:    Surgery was uncomplicated. Agatha DELANO Garcia Polmarvinn has done well post-operatively. Medicine was consulted post operatively to aid in management of medical comorbidities. See final recommendations below. The patient received routine nursing cares and is medically stable. Vital signs are stable. The patient is tolerating a regular diet without GI distress/nausea or vomiting. Voiding spontaneously. All PT/OT goals have been met for safe mobility. Pain is now controlled on oral medications which will be available on discharge. Stool softeners have been used while taking pain medications to help prevent constipation. Agatha DELANO Vinsonn is deemed medically safe to discharge.     Antibiotics:   given periop and 24 hours postop.   DVT prophylaxis:  warfarin daily for 4 weeks  PT Progress: Has met PT/OT goals for safe mobility.    Pain Meds:  Weaned off all IV pain meds by discharge.  Inpatient Events: No significant events or complications.     Discharge orders and instructions as below.    FOLLOWUP:    Follow up with Dr. york at 2 weeks postoperatively.  Future Appointments  Date Time Provider Department Center   4/2/2018 2:40 PM Yane Elena PT [facility]   4/6/2018 10:00 AM Ishaan Beavers Pt, PT [facility]   4/10/2018 3:00 PM Ruthann PtChuchoIshaan, PT [facility]   4/12/2018 1:00 PM Ruthann Pt, Ishaan, PT [facility]       Appointments on [facility]. Call [phone] if you haven't heard regarding these appointments within 7 days of discharge.    PLANNED DISCHARGE  ORDERS:           Discharge Medication List as of 3/30/2018  3:01 PM      START taking these medications    Details   oxyCODONE IR (ROXICODONE) 5 MG tablet Take 1-2 tablets (5-10 mg) by mouth every 4 hours as needed for other (pain control or improvement in physical function. Hold dose for analgesic side effects.), Disp-60 tablet, R-0, Local Print      acetaminophen (TYLENOL) 325 MG tablet Take 1-2 tablets every 4 hours as needed for pain., Disp-100 tablet, R-0, E-Prescribe      warfarin (COUMADIN) 5 MG tablet Take 1 tablet today, tomorrow and Sunday. INR Monday. Additional instructions per anticoagulation clinic., Disp-45 tablet, R-0, E-Prescribe      senna-docusate (SENOKOT-S;PERICOLACE) 8.6-50 MG per tablet Take 1-2 tablets by mouth 2 times daily Hold for loose stools. Discontinue as able when narcotic use is discontinued., Disp-50 tablet, R-0, E-Prescribe      methocarbamol (ROBAXIN) 750 MG tablet Take 1 tablet (750 mg) by mouth 4 times daily as needed for muscle spasms, Disp-35 tablet, R-0, E-Prescribe      order for DME Equipment being ordered: Crutches ()  Treatment Diagnosis: gait instablityDisp-1 each, R-0, Local Print         CONTINUE these medications which have NOT CHANGED    Details   multivitamin, therapeutic with minerals (THERA-VIT-M) TABS tablet Take 1 tablet by mouth daily, Historical      glucosamine-chondroitin 500-400 MG CAPS per capsule Take 1 capsule by mouth 2 times daily, Historical      Vitamin D, Cholecalciferol, 1000 UNITS CAPS Take 1,000 Units by mouth daily, Historical      estradiol (VIVELLE-DOT) 0.05 MG/24HR patch Place 1 patch onto the skin twice a week, Historical      Progesterone Micronized (PROGESTERONE PO) Take 1 tablet by mouth daily, Historical      Omega-3 Fatty Acids (OMEGA-3 FISH OIL PO) Take 2 capsules by mouth 2 times daily , Historical               Discharge Procedure Orders  Physical Therapy Referral   Referral Type: Rehab Therapy Physical Therapy     INR Clinic  Referral     Reason for your hospital stay   Order Comments: You had knee surgery     Follow Up and recommended labs and tests   Order Comments: INR Monday.     Activity   Order Comments: Your activity upon discharge: as tolerated. Ice and elevate your knee when resting to decrease swelling.   Order Specific Question Answer Comments   Is discharge order? Yes      When to contact your care team   Order Comments: CALL YOUR PHYSICIAN IF:  1.  Your pain begins to worsen and is unable to be controlled with your medications.  2.  Excessive redness or drainage of cloudy or bloody material from the wounds (Clear red tinted fluid and some mild drainage should be expected). Drainage of any kind 5 days after surgery should be reported to the doctor.  3.  You have a temperature elevation greater than 101.5    4.  You have pain, swelling or redness in your calf. You have numbness or weakness in your leg or foot.    During regular business hours call the [facility] at [phone] and ask for the triage nurse. After hours or weekends call the hospital  at [phone] and ask for the ortho resident on call.     Wound care and dressings   Order Comments: Instructions to care for your wound at home:     Discharge Instructions   Order Comments: TOTAL KNEE ARTHROPLASTY POST OPERATIVE INSTRUCTIONS    COMFORT:  Elevation: Elevate your knee and ankle above the level of your heart. The best position is lying down with two pillows lengthwise under your entire leg. Do not place pillows under your knee. This should be done for the first several days after surgery.  Ice: An ice pack will be provided to control swelling and discomfort. You may apply the ice pack over the dressing for up to 20 minutes every hour for the first 48 hours after surgery. After this period, you may use the ice pack as needed. Always place a thin towel around the ice pack to prevent direct contact with the skin.   Pain medication: Take medication as prescribed, but  only as often as necessary.  Do not drive a motor vehicle, operate machinery or appliances, make important decisions, sign legal documents, or drink alcohol while using narcotic pain medications. Remember that acetaminophen (Tylenol) can also be used for pain relief, and the maximum Tylenol dose for a single day is 3000 mg.            ACTIVITIES:  Exercises: Point and flex your feet and wiggle your toes, move your ankle around in a [place]  to help prevent complications such as blood clotting in your legs. Quad muscle isometric and short arc exercises should begin the day of surgery and should be done for 10 to 15 minutes, 3 times a day for the first 2 to 3 weeks after surgery. Focus on obtaining full hyperextension to 90 degrees knee flexion.  CPM: CPM should be set at 0 degrees to maximum flexion tolerance. Advance aggressively in increments of 5 degrees toward goal of 90 degrees flexion.   Weight bearing status: You are allowed to put weight on your operative leg (weight bearing as tolerated) but may it be limited due to pain. Walk using two crutches. Duration of crutch use is typically 1-2 weeks for indoor movement and 2-4 weeks for outdoor activity.   Physical Therapy: A prescription and protocol sheet for physical therapy will be sent home with you and must be taken to your first therapy visit.  Driving: Driving is NOT permitted until allowed by your provider. Generally, this is 3 to 4 weeks following right knee surgery and 2 to 3 weeks following left knee surgery.  Athletic activities: Athletic activities such as swimming, bicycling, jogging, running and stop-and-go-sports should be avoided until allowed by your provider.   Return to work: You may return to work once allowed by your provider. Generally, patients with desk jobs may return to work in 3 to 4 days. Patients with heavy labor jobs usually can return to work in 3 months.    DIET:   Resume your regular diet as soon as tolerated. Try to increase your  fluids, fibers, fruits, and vegetables to prevent constipation from pain medications.  WOUND CARES:   Dressing: Keep the initial post-op dressing clean, and dry for the first 3 days after surgery. You may then remove the dressing to shower. After showering, please apply a fresh dressing to the wound using 4x4s and tape. Continue these dressing changes until postoperative day 14.  Incision: The incision was closed with a buried absorbable running stitch, and a Dermabond PRINEO Skin Closure System  (clear mesh tape) was applied overtop. The clear mesh tape can be removed around postoperative day 14. The best way to do this is to gently grasp the mesh tape at one end of the wound and slowly peel it away from the skin along the line of the wound. Take care when peeling the mesh to ensure that you are applying tension to the mesh in a direction that is parallel, and not perpendicular, to the skin surface. It is possible that the tape may begin to lift from the skin surface before postoperative day 14. If this occurs, you may trim any free edges that are no longer adhered to the skin surface. To do this, grasp the free mesh, apply gentle tension, and trim with a scissors.   Bathing: You may shower on postoperative day 3. Tub bathing, swimming, and soaking of the incision should be avoided until allowed by your provider.  CALL YOUR PROVIDER IMMEDIATELY IF:  Pain in your knee becomes more severe or uncontrollable after surgery.  You experience excessive redness, warmth, or drainage from the operative site. Although clear red tinted fluid and some mild drainage should be expected following surgery, drainage of any kind after 5 days should be reported to your provider.  You have a temperature elevation greater than 101.5 .   You have pain, swelling or redness in your calf.  You have numbness or weakness in your leg or foot.  You have persistent nausea or vomiting.    You may contact your physician at [phone] during  business hours.  For after hours or weekend calls, you may contact the hospital at [phone] and ask for the on-call orthopedic resident  florinda     Assign Questionnaire Series to Patient         Rafi Johnson MD  PGY-4 Orthopaedic Surgery

## 2018-04-02 ENCOUNTER — ANTICOAGULATION THERAPY VISIT (OUTPATIENT)
Dept: ANTICOAGULATION | Facility: CLINIC | Age: 63
End: 2018-04-02

## 2018-04-02 ENCOUNTER — THERAPY VISIT (OUTPATIENT)
Dept: PHYSICAL THERAPY | Facility: CLINIC | Age: 63
End: 2018-04-02
Payer: COMMERCIAL

## 2018-04-02 DIAGNOSIS — M25.562 ACUTE PAIN OF LEFT KNEE: Primary | ICD-10-CM

## 2018-04-02 DIAGNOSIS — R60.9 EDEMA: ICD-10-CM

## 2018-04-02 DIAGNOSIS — Z96.652 AFTERCARE FOLLOWING LEFT KNEE JOINT REPLACEMENT SURGERY: ICD-10-CM

## 2018-04-02 DIAGNOSIS — Z47.1 AFTERCARE FOLLOWING LEFT KNEE JOINT REPLACEMENT SURGERY: ICD-10-CM

## 2018-04-02 DIAGNOSIS — Z96.652 STATUS POST TOTAL KNEE REPLACEMENT, LEFT: ICD-10-CM

## 2018-04-02 PROBLEM — M25.561 ACUTE PAIN OF RIGHT KNEE: Status: ACTIVE | Noted: 2018-04-02

## 2018-04-02 PROCEDURE — 97110 THERAPEUTIC EXERCISES: CPT | Mod: GP | Performed by: PHYSICAL THERAPIST

## 2018-04-02 PROCEDURE — 97161 PT EVAL LOW COMPLEX 20 MIN: CPT | Mod: GP | Performed by: PHYSICAL THERAPIST

## 2018-04-02 PROCEDURE — 97016 VASOPNEUMATIC DEVICE THERAPY: CPT | Mod: GP | Performed by: PHYSICAL THERAPIST

## 2018-04-02 ASSESSMENT — ACTIVITIES OF DAILY LIVING (ADL)
KNEEL ON THE FRONT OF YOUR KNEE: I AM UNABLE TO DO THE ACTIVITY
GO UP STAIRS: ACTIVITY IS FAIRLY DIFFICULT
RISE FROM A CHAIR: ACTIVITY IS FAIRLY DIFFICULT
RAW_SCORE: 15
GIVING WAY, BUCKLING OR SHIFTING OF KNEE: THE SYMPTOM AFFECTS MY ACTIVITY SEVERELY
PAIN: THE SYMPTOM AFFECTS MY ACTIVITY SEVERELY
HOW_WOULD_YOU_RATE_THE_CURRENT_FUNCTION_OF_YOUR_KNEE_DURING_YOUR_USUAL_DAILY_ACTIVITIES_ON_A_SCALE_FROM_0_TO_100_WITH_100_BEING_YOUR_LEVEL_OF_KNEE_FUNCTION_PRIOR_TO_YOUR_INJURY_AND_0_BEING_THE_INABILITY_TO_PERFORM_ANY_OF_YOUR_USUAL_DAILY_ACTIVITIES?: 5
SQUAT: I AM UNABLE TO DO THE ACTIVITY
KNEE_ACTIVITY_OF_DAILY_LIVING_SCORE: 21.43
SIT WITH YOUR KNEE BENT: ACTIVITY IS VERY DIFFICULT
STIFFNESS: THE SYMPTOM AFFECTS MY ACTIVITY SEVERELY
KNEE_ACTIVITY_OF_DAILY_LIVING_SUM: 15
WALK: ACTIVITY IS FAIRLY DIFFICULT
LIMPING: THE SYMPTOM PREVENTS ME FROM ALL DAILY ACTIVITIES
AS_A_RESULT_OF_YOUR_KNEE_INJURY,_HOW_WOULD_YOU_RATE_YOUR_CURRENT_LEVEL_OF_DAILY_ACTIVITY?: SEVERELY ABNORMAL
HOW_WOULD_YOU_RATE_THE_OVERALL_FUNCTION_OF_YOUR_KNEE_DURING_YOUR_USUAL_DAILY_ACTIVITIES?: SEVERELY ABNORMAL
STAND: ACTIVITY IS VERY DIFFICULT
SWELLING: THE SYMPTOM AFFECTS MY ACTIVITY SEVERELY
WEAKNESS: THE SYMPTOM AFFECTS MY ACTIVITY SEVERELY
GO DOWN STAIRS: ACTIVITY IS FAIRLY DIFFICULT

## 2018-04-02 NOTE — PROGRESS NOTES
[facility] Initial Evaluation  Subjective:  Patient is a 62 year old female presenting with rehab left knee hpi. The history is provided by the patient. No  was used.   Agatha Fung is a 62 year old female with a left knee condition.  Condition occurred with:  Other reason.  Condition occurred: other.  This is a new condition  Pt had left TKA on 3/29/18. Currently has pain at rest and is PWB with two crutches.  Works from home and travels 60% of the time as a .  Has stairs at home and would like to get back to walking and hiking.  Having difficulty sleeping and using CPM at home.  Was limited in flexion and extension prior to the surgery.  .    Patient reports pain:  Anterior, posterior and medial.    Pain is described as aching and is constant and reported as 8/10.  Associated symptoms:  Loss of motion/stiffness. Pain is the same all the time.  Symptoms are exacerbated by activity and walking and relieved by ice.  Since onset symptoms are unchanged.                                                      Objective:    Gait:    Weight Bearing Status:  WBAT   Assistive Devices:  Crutches                                                        Knee Evaluation:  ROM:    AROM    Hyperextension:  Left:  0    Right: 0  Extension:  Left: 12    Right:  WNL  Flexion: Left: 68    Right: WNL  PROM    Hyperextension: Left: 0    Right:  3  Extension: Left: 1   Right:   Flexion: Left: 80    Right:  WNL      Strength:         Quad Set Left: Fair    Pain:     Ligament Testing:  Not Assessed                Special Tests: Not Assessed      Palpation:  Palpation of knee: warm with palpation.      Edema:  Edema of the knee: general with ecchymosis in the lower leg and thigh and knee.    Mobility Testing:  Not Assessed            Functional Testing:  not assessed                  General     ROS    Assessment/Plan:    Patient is a 62 year old female with left side knee  complaints.    Patient has the following significant findings with corresponding treatment plan.                Diagnosis 1:  Left TKA  Pain -  manual therapy, self management, education and home program  Decreased ROM/flexibility - manual therapy, therapeutic exercise and home program  Decreased strength - therapeutic exercise, therapeutic activities and home program  Inflammation - cold therapy, electric stimulation and self management/home program  Edema - vasopneumatics and cold therapy  Impaired gait - gait training, assistive devices and home program  Decreased function - therapeutic activities and home program    Therapy Evaluation Codes:   1) History comprised of:   Personal factors that impact the plan of care:      None.    Comorbidity factors that impact the plan of care are:      None.     Medications impacting care: None.  2) Examination of Body Systems comprised of:   Body structures and functions that impact the plan of care:      Knee.   Activity limitations that impact the plan of care are:      Bathing, Bending, Cooking, Driving, Dressing, Sports, Squatting/kneeling, Stairs, Standing, Walking and Sleeping.  3) Clinical presentation characteristics are:   Stable/Uncomplicated.  4) Decision-Making    Low complexity using standardized patient assessment instrument and/or measureable assessment of functional outcome.  Cumulative Therapy Evaluation is: Low complexity.    Previous and current functional limitations:  (See Goal Flow Sheet for this information)    Short term and Long term goals: (See Goal Flow Sheet for this information)     Communication ability:  Patient appears to be able to clearly communicate and understand verbal and written communication and follow directions correctly.  Treatment Explanation - The following has been discussed with the patient:   RX ordered/plan of care  Anticipated outcomes  Possible risks and side effects  This patient would benefit from PT intervention to resume  normal activities.   Rehab potential is excellent.    Frequency:  2 X week, once daily  Duration:  for 3 weeks tapering to 1 X a week over 6 weeks  Discharge Plan:  Achieve all LTG.  Independent in home treatment program.  Reach maximal therapeutic benefit.    Please refer to the daily flowsheet for treatment today, total treatment time and time spent performing 1:1 timed codes.

## 2018-04-02 NOTE — MR AVS SNAPSHOT
After Visit Summary   4/2/2018    Agatha Fung    MRN: [id number]           Patient Information     Date Of Birth          1955        Visit Information        Provider Department      4/2/2018 2:40 PM Yane Elena, PT [facility]        Today's Diagnoses     Acute pain of left knee    -  1    Status post total knee replacement, left        Edema           Follow-ups after your visit        Your next 10 appointments already scheduled     Apr 06, 2018 10:00 AM CDT   TAINA Extremity with Ishaan Beavers Pt, PT   [place] [facility] (TA[facility]  )    [address]  [facility] [address]   [phone]            Apr 10, 2018  3:00 PM CDT   TAINA Extremity with Ishaan Beavers Pt, PT   [facility] ([facility]  )    [address]  [facility] [address]   [phone]            Apr 13, 2018 11:30 AM CDT   TAINA Extremity with Miguel Garza, PT   [facility] ([facility]  [facility]    [address] [place] [address] [phone]            Apr 16, 2018 10:50 AM CDT   TAINA Extremity with Miguel Garza, PT   [facility] ([facility]  )    [address] [place] [address] [phone]            Apr 20, 2018 10:50 AM CDT   TAINA Extremity with Miguel Garza PT   [facility] ([facility]  [facility]    [facility] [address] [phone]              Who to contact     If you have questions or need follow up information about today's clinic visit or your schedule please contact [place] [facility] PH[facility]ICAL THERAPY directly at [phone].  Normal or non-critical lab and imaging results will be communicated to you by  MyChart, letter or phone within 4 business days after the clinic has received the results. If you do not hear from us within 7 days, please contact the clinic through Ourcastt or phone. If you have a critical or abnormal lab result, we will notify you by phone as soon as possible.  Submit refill requests through Vigme or call your pharmacy and they will forward the refill request to us. Please allow 3 business days for your refill to be completed.          Additional Information About Your Visit        [employer]. Java Information     Vigme gives you secure access to your electronic health record. If you see a primary care provider, you can also send messages to your care team and make appointments. If you have questions, please call your primary care clinic.  If you do not have a primary care provider, please call [phone] and they will assist you.        Care EveryWhere ID     This is your Care EveryWhere ID. This could be used by other organizations to access your [place] medical records  [id number]         Blood Pressure from Last 3 Encounters:   03/30/18 119/65    Weight from Last 3 Encounters:   03/29/18 72.3 kg (159 lb 6.3 oz)   08/02/16 70.7 kg (155 lb 14.4 oz)              We Performed the Following     C VASOPNEUMATIC DEVICE     HC PT EVAL, LOW COMPLEXITY     TAINA INITIAL EVAL REPORT     THERAPEUTIC EXERCISES        Primary Care Provider Office Phone # Fax #    Agatha King [phone]       [address] [place] [address]        Equal Access to Services     GREG LOCKETT AH: Hadii aad ku hadasho Soomaali, waaxda luqadaha, qaybta kaalmada adeegyada, waxay cyndi feliciano. So [facility] [phone].    ATENCIÓN: Si habla español, tiene a montemayor disposición servicios gratuitos de asistencia lingüística. Llame al [phone].    We comply with applicable federal civil rights laws and [place] laws. We do not discriminate on the basis of race, color,  national origin, age, disability, sex, sexual orientation, or gender identity.            Thank you!     Thank you for choosing [place] [facility] PHYSICAL THERAPY  for your care. Our goal is always to provide you with excellent care. Hearing back from our patients is one way we can continue to improve our services. Please take a few minutes to complete the written survey that you may receive in the mail after your visit with us. Thank you!             Your Updated Medication List - Protect others around you: Learn how to safely use, store and throw away your medicines at www.disposemymeds.org.          This list is accurate as of 4/2/18  4:43 PM.  Always use your most recent med list.                   Brand Name Dispense Instructions for use Diagnosis    acetaminophen 325 MG tablet    TYLENOL    100 tablet    Take 1-2 tablets every 4 hours as needed for pain.    Status post total knee replacement, unspecified laterality       estradiol 0.05 MG/24HR BIW patch    VIVELLE-DOT     Place 1 patch onto the skin twice a week        glucosamine-chondroitin 500-400 MG Caps per capsule      Take 1 capsule by mouth 2 times daily        methocarbamol 750 MG tablet    ROBAXIN    35 tablet    Take 1 tablet (750 mg) by mouth 4 times daily as needed for muscle spasms    Status post total knee replacement, unspecified laterality       multivitamin, therapeutic with minerals Tabs tablet      Take 1 tablet by mouth daily        OMEGA-3 FISH OIL PO      Take 2 capsules by mouth 2 times daily        order for DME     1 each    Equipment being ordered: Crutches () Treatment Diagnosis: gait instablity    Status post total knee replacement, unspecified laterality       oxyCODONE IR 5 MG tablet    ROXICODONE    60 tablet    Take 1-2 tablets (5-10 mg) by mouth every 4 hours as needed for other (pain control or improvement in physical function. Hold dose for analgesic side effects.)    Status post total knee  replacement, unspecified laterality       PROGESTERONE PO      Take 1 tablet by mouth daily        senna-docusate 8.6-50 MG per tablet    SENOKOT-S;PERICOLACE    50 tablet    Take 1-2 tablets by mouth 2 times daily Hold for loose stools. Discontinue as able when narcotic use is discontinued.    Status post total knee replacement, unspecified laterality       Vitamin D (Cholecalciferol) 1000 UNITS Caps      Take 1,000 Units by mouth daily        warfarin 5 MG tablet    COUMADIN    45 tablet    Take 1 tablet today, tomorrow and Sunday. INR Monday. Additional instructions per anticoagulation clinic.    Status post total knee replacement, unspecified laterality

## 2018-04-02 NOTE — PROGRESS NOTES
Dates of hospitalization: 3/29 to 3/30  Reason for hospitalization: TKA  Procedures performed: TKA  Vitamin K or FFP administered? no  Inpatient warfarin doses added to calendar? yes  Medication changes at discharge: Start Ty, Robaxin, and oxcodone  Warfarin dosing after DC: 5mg FSatSun  Patient discharged on Lovenox? no  Next INR date: 4/2  Where is the patient discharging to? (home, TCU, staying locally, etc.): home  Will patient have home care? unsure    Addendum 4/10/18  Update: INR lab draw orders faxed to [email or web address], phone [phone], fax [phone].[facility]

## 2018-04-02 NOTE — MR AVS SNAPSHOT
Agatha Fung   4/2/2018   Anticoagulation Therapy Visit    Description:  62 year old female   Provider:  Marisabel Mccrary, RN   Department:  [facility]           INR as of 4/2/2018     Today's INR No new INR was available at the time of this encounter.      Anticoagulation Summary as of 4/2/2018     INR goal 2.0-2.5   Today's INR No new INR was available at the time of this encounter.   Full instructions No maintenance plan   Next INR check 4/2/2018    Indications   Aftercare following joint replacement [Z47.1] [Z47.1]         April 2018 Details    Sun Mon Tue Wed Thu Fri Sat     1               2      See details      3               4               5               6               7                 8               9               10               11               12               13               14                 15               16               17               18               19               20               21                 22               23               24               25               26               27               28                 29               30                     Date Details   04/02 This INR check       Date of next INR:  4/2/2018

## 2018-04-04 NOTE — PROGRESS NOTES
[place] [facility] Initial Evaluation  Subjective:  Patient is a 62 year old female presenting with rehab left ankle/foot hpi.                                      Pertinent medical history includes:  Osteoarthritis.  Medical allergies: no.  Other surgeries include:  Orthopedic surgery (L Knee replacement, carpel tunnel, toe surgery).  Current medications:  Heparin/coumadin and pain medication (Stool softner).  Current occupation is .    Primary job tasks include:  Driving (Pushing/pulling, computer work, travel).                                Objective:  System    Physical Exam    General     ROS    Assessment/Plan:

## 2018-04-06 ENCOUNTER — THERAPY VISIT (OUTPATIENT)
Dept: PHYSICAL THERAPY | Facility: CLINIC | Age: 63
End: 2018-04-06
Payer: COMMERCIAL

## 2018-04-06 DIAGNOSIS — M25.562 ACUTE PAIN OF LEFT KNEE: Primary | ICD-10-CM

## 2018-04-06 DIAGNOSIS — R60.0 LOCALIZED EDEMA: ICD-10-CM

## 2018-04-06 PROCEDURE — 97110 THERAPEUTIC EXERCISES: CPT | Mod: GP | Performed by: PHYSICAL THERAPIST

## 2018-04-06 PROCEDURE — 97016 VASOPNEUMATIC DEVICE THERAPY: CPT | Mod: GP | Performed by: PHYSICAL THERAPIST

## 2018-04-10 ENCOUNTER — TELEPHONE (OUTPATIENT)
Dept: ORTHOPEDICS | Facility: CLINIC | Age: 63
End: 2018-04-10

## 2018-04-10 ENCOUNTER — THERAPY VISIT (OUTPATIENT)
Dept: PHYSICAL THERAPY | Facility: CLINIC | Age: 63
End: 2018-04-10
Payer: COMMERCIAL

## 2018-04-10 DIAGNOSIS — Z96.659 STATUS POST TOTAL KNEE REPLACEMENT, UNSPECIFIED LATERALITY: ICD-10-CM

## 2018-04-10 DIAGNOSIS — R60.0 LOCALIZED EDEMA: ICD-10-CM

## 2018-04-10 DIAGNOSIS — M25.562 ACUTE PAIN OF LEFT KNEE: ICD-10-CM

## 2018-04-10 PROCEDURE — 97110 THERAPEUTIC EXERCISES: CPT | Mod: GP | Performed by: PHYSICAL THERAPIST

## 2018-04-10 PROCEDURE — 97016 VASOPNEUMATIC DEVICE THERAPY: CPT | Mod: GP | Performed by: PHYSICAL THERAPIST

## 2018-04-10 NOTE — TELEPHONE ENCOUNTER
Returned patient's call regarding questions about INR draw and coumadin.  [facility]  I contacted the INR clinic here to make sure they were aware of this.  THey will fax new orders to them.  Patient has her follow up scheduled at [facility] on 4/12/18.  She has our contact information for any further questions or concerns.

## 2018-04-11 RX ORDER — OXYCODONE HYDROCHLORIDE 5 MG/1
5-10 TABLET ORAL EVERY 4 HOURS PRN
Qty: 60 TABLET | Refills: 0 | OUTPATIENT
Start: 2018-04-11

## 2018-04-11 RX ORDER — ACETAMINOPHEN 325 MG/1
TABLET ORAL
Qty: 100 TABLET | Refills: 0 | OUTPATIENT
Start: 2018-04-11

## 2018-04-12 ENCOUNTER — ANTICOAGULATION THERAPY VISIT (OUTPATIENT)
Dept: ANTICOAGULATION | Facility: CLINIC | Age: 63
End: 2018-04-12

## 2018-04-12 DIAGNOSIS — Z96.652 AFTERCARE FOLLOWING LEFT KNEE JOINT REPLACEMENT SURGERY: ICD-10-CM

## 2018-04-12 DIAGNOSIS — Z47.1 AFTERCARE FOLLOWING LEFT KNEE JOINT REPLACEMENT SURGERY: ICD-10-CM

## 2018-04-12 NOTE — MR AVS SNAPSHOT
Agatha Fung   4/12/2018   Anticoagulation Therapy Visit    Description:  62 year old female   Provider:  No Chisholm RN   Department:  [facility]           INR as of 4/12/2018     Today's INR       Anticoagulation Summary as of 4/12/2018     INR goal 2.0-2.5   Today's INR    Full instructions No maintenance plan   Next INR check     Indications   Aftercare following joint replacement [Z47.1] [Z47.1]         Anticoagulation Episode Summary     Resolved date 4/12/2018    Resolved reason Outside MD

## 2018-04-12 NOTE — PROGRESS NOTES
Spoke with Agatha today.  She was already given instructions on what to do with warfarin dosing by someone named Vale Davis.  Patient's surgery is through [facility] so [facility] clinic should not have been referred this patient.  [facility] clinic will step out of care.  Patient will call Dr. Richter's office for refills.

## 2018-04-13 ENCOUNTER — THERAPY VISIT (OUTPATIENT)
Dept: PHYSICAL THERAPY | Facility: CLINIC | Age: 63
End: 2018-04-13
Payer: COMMERCIAL

## 2018-04-13 DIAGNOSIS — M25.562 ACUTE PAIN OF LEFT KNEE: ICD-10-CM

## 2018-04-13 DIAGNOSIS — R60.0 LOCALIZED EDEMA: ICD-10-CM

## 2018-04-13 PROCEDURE — 97530 THERAPEUTIC ACTIVITIES: CPT | Mod: GP | Performed by: PHYSICAL THERAPIST

## 2018-04-13 PROCEDURE — 97110 THERAPEUTIC EXERCISES: CPT | Mod: GP | Performed by: PHYSICAL THERAPIST

## 2018-04-16 ENCOUNTER — THERAPY VISIT (OUTPATIENT)
Dept: PHYSICAL THERAPY | Facility: CLINIC | Age: 63
End: 2018-04-16
Payer: COMMERCIAL

## 2018-04-16 DIAGNOSIS — M25.562 ACUTE PAIN OF LEFT KNEE: ICD-10-CM

## 2018-04-16 DIAGNOSIS — R60.0 LOCALIZED EDEMA: ICD-10-CM

## 2018-04-16 PROCEDURE — 97110 THERAPEUTIC EXERCISES: CPT | Mod: GP | Performed by: PHYSICAL THERAPIST

## 2018-04-16 PROCEDURE — 97530 THERAPEUTIC ACTIVITIES: CPT | Mod: GP | Performed by: PHYSICAL THERAPIST

## 2018-04-19 ENCOUNTER — THERAPY VISIT (OUTPATIENT)
Dept: PHYSICAL THERAPY | Facility: CLINIC | Age: 63
End: 2018-04-19
Payer: COMMERCIAL

## 2018-04-19 DIAGNOSIS — M25.562 ACUTE PAIN OF LEFT KNEE: ICD-10-CM

## 2018-04-19 DIAGNOSIS — R60.0 LOCALIZED EDEMA: ICD-10-CM

## 2018-04-19 PROCEDURE — 97110 THERAPEUTIC EXERCISES: CPT | Mod: GP | Performed by: PHYSICAL THERAPIST

## 2018-04-19 PROCEDURE — 97140 MANUAL THERAPY 1/> REGIONS: CPT | Mod: GP | Performed by: PHYSICAL THERAPIST

## 2018-04-20 ENCOUNTER — TELEPHONE (OUTPATIENT)
Dept: ORTHOPEDICS | Facility: CLINIC | Age: 63
End: 2018-04-20

## 2018-04-20 NOTE — TELEPHONE ENCOUNTER
Patient called after receiving information from [facility] nurse that patient is traveling to [place] on 4/29/18.  She is s/p Left TKA on 3/29/18.  Discussed with patient that our normal visit with Dr. Richter is around 10 weeks post op.  She is progressing very well, flexion was measured at 110 this week in PT.  She was seen by nurse at [facility] and her incision is healing well.    Patient was advised to take a daily aspirin after her coumadin is completed due to flying.  She stated she discussed her travel plans with Dr. Richter prior to surgery.  She knows to wear compression stockings and move her legs/feet as much as possible in plane.    She would like to transfer care here to the  to see Dr. Richter instead of going out to [facility] [place].  Her follow up was scheduled for 6/5/18.  She has our phone number for any further questions or concerns.

## 2018-04-24 ENCOUNTER — THERAPY VISIT (OUTPATIENT)
Dept: PHYSICAL THERAPY | Facility: CLINIC | Age: 63
End: 2018-04-24
Payer: COMMERCIAL

## 2018-04-24 DIAGNOSIS — R60.0 LOCALIZED EDEMA: ICD-10-CM

## 2018-04-24 DIAGNOSIS — M25.562 ACUTE PAIN OF LEFT KNEE: ICD-10-CM

## 2018-04-24 PROCEDURE — 97110 THERAPEUTIC EXERCISES: CPT | Mod: GP | Performed by: PHYSICAL THERAPIST

## 2018-04-24 PROCEDURE — 97140 MANUAL THERAPY 1/> REGIONS: CPT | Mod: GP | Performed by: PHYSICAL THERAPIST

## 2018-04-27 ENCOUNTER — THERAPY VISIT (OUTPATIENT)
Dept: PHYSICAL THERAPY | Facility: CLINIC | Age: 63
End: 2018-04-27
Payer: COMMERCIAL

## 2018-04-27 DIAGNOSIS — M25.562 ACUTE PAIN OF LEFT KNEE: Primary | ICD-10-CM

## 2018-04-27 PROCEDURE — 97140 MANUAL THERAPY 1/> REGIONS: CPT | Mod: GP | Performed by: PHYSICAL THERAPIST

## 2018-04-27 PROCEDURE — 97110 THERAPEUTIC EXERCISES: CPT | Mod: GP | Performed by: PHYSICAL THERAPIST

## 2018-05-11 ENCOUNTER — THERAPY VISIT (OUTPATIENT)
Dept: PHYSICAL THERAPY | Facility: CLINIC | Age: 63
End: 2018-05-11
Payer: COMMERCIAL

## 2018-05-11 DIAGNOSIS — M25.562 ACUTE PAIN OF LEFT KNEE: ICD-10-CM

## 2018-05-11 DIAGNOSIS — R60.0 LOCALIZED EDEMA: ICD-10-CM

## 2018-05-11 PROCEDURE — 97140 MANUAL THERAPY 1/> REGIONS: CPT | Mod: GP | Performed by: PHYSICAL THERAPIST

## 2018-05-11 PROCEDURE — 97110 THERAPEUTIC EXERCISES: CPT | Mod: GP | Performed by: PHYSICAL THERAPIST

## 2018-05-19 LAB — MAMMOGRAM: NORMAL

## 2018-05-22 ENCOUNTER — THERAPY VISIT (OUTPATIENT)
Dept: PHYSICAL THERAPY | Facility: CLINIC | Age: 63
End: 2018-05-22
Payer: COMMERCIAL

## 2018-05-22 DIAGNOSIS — R60.0 LOCALIZED EDEMA: ICD-10-CM

## 2018-05-22 DIAGNOSIS — M25.562 ACUTE PAIN OF LEFT KNEE: ICD-10-CM

## 2018-05-22 PROCEDURE — 97140 MANUAL THERAPY 1/> REGIONS: CPT | Mod: GP | Performed by: PHYSICAL THERAPIST

## 2018-05-22 PROCEDURE — 97110 THERAPEUTIC EXERCISES: CPT | Mod: GP | Performed by: PHYSICAL THERAPIST

## 2018-05-22 PROCEDURE — 97016 VASOPNEUMATIC DEVICE THERAPY: CPT | Mod: GP | Performed by: PHYSICAL THERAPIST

## 2018-05-22 NOTE — PROGRESS NOTES
Subjective:  HPI                    Objective:  System    Physical Exam    General     ROS    Assessment/Plan:    PROGRESS  REPORT    Progress reporting period is from 18 to 18.       SUBJECTIVE  Subjective changes noted by patient:  35' stationary bike and 30' elliptical. 3 miles walking in 50' time. Back to work. 9-12 thousand steps per day per Fit-Bit.      Current pain level is 3/10  .     Previous pain level was  8/10 Initial Pain level: 8/10.   Changes in function:  Yes (See Goal flowsheet attached for changes in current functional level)  Adverse reaction to treatment or activity: None    OBJECTIVE  Changes noted in objective findings:  Yes, AROM 0-0-115  PROM 0-0-121  Girth is still 1 inch greater  SLR no lag with VMO recruitment  30 sec SL balance          ASSESSMENT/PLAN  Updated problem list and treatment plan: Diagnosis 1:  L TKA  Pain -  hot/cold therapy, self management, education and home program  Decreased ROM/flexibility - manual therapy and therapeutic exercise  Decreased joint mobility - manual therapy and therapeutic exercise  Decreased strength - therapeutic exercise and therapeutic activities  Edema - vasopneumatics  Impaired gait - gait training  Impaired muscle performance - neuro re-education  Decreased function - therapeutic activities  STG/LTGs have been met or progress has been made towards goals:  Yes (See Goal flow sheet completed today.)  Assessment of Progress: The patient's condition is improving.  The patient's condition has potential to improve.  Self Management Plans:  Patient is independent in a home treatment program.    Agatha continues to require the following intervention to meet STG and LTG's:  PT    Recommendations:  Patient would benefit from the followin more visit and then possible DC to HEP            Please refer to the daily flowsheet for treatment today, total treatment time and time spent performing 1:1 timed codes.

## 2018-05-22 NOTE — MR AVS SNAPSHOT
After Visit Summary   5/22/2018    Agatha Fung    MRN: [id number]           Patient Information     Date Of Birth          1955        Visit Information        Provider Department      5/22/2018 1:10 PM Ruthann Pt, Ishaan PT [facility] Physical Therapy        Today's Diagnoses     Acute pain of left knee        Localized edema           Follow-ups after your visit        Your next 10 appointments already scheduled     Jun 01, 2018  3:20 PM CDT   TAINA Extremity with Yane Elena PT   [facility] Physical Therapy (TAINA [place]  )    [address]  [facility] [id number] [phone]            Jun 05, 2018 11:30 AM CDT   (Arrive by 11:15 AM)   RETURN KNEE with Pamela Richter MD   [facility] ([facility])    [address] [facility]  [address] [facility] [address] [phone]              Who to contact     If you have questions or need follow up information about today's clinic visit or your schedule please contact [place] [facility] PHYSICAL THERAPY directly at [phone].  Normal or non-critical lab and imaging results will be communicated to you by MyChart, letter or phone within 4 business days after the clinic has received the results. If you do not hear from us within 7 days, please contact the clinic through [employer] Online[employer] or phone. If you have a critical or abnormal lab result, we will notify you by phone as soon as possible.  Submit refill requests through Audinate or call your pharmacy and they will forward the refill request to us. Please allow 3 business days for your refill to be completed.          Additional Information About Your Visit        MyChart Information     Audinate gives you secure access to your electronic health record. If you see a primary care provider, you can also send messages to your care team and make appointments.  If you have questions, please call your primary care clinic.  If you do not have a primary care provider, please call [phone] and they will assist you.        Care EveryWhere ID     This is your Care EveryWhere ID. This could be used by other organizations to access your [place] medical records  [id number]         Blood Pressure from Last 3 Encounters:   03/30/18 119/65    Weight from Last 3 Encounters:   03/29/18 72.3 kg (159 lb 6.3 oz)   08/02/16 70.7 kg (155 lb 14.4 oz)              We Performed the Following     TAINA PROGRESS NOTES REPORT     MANUAL THER TECH,1+REGIONS,EA 15 MIN     THERAPEUTIC EXERCISES     Vasopneumatic Device        Primary Care Provider Office Phone # Fax #    Agatha Campuzanoam [phone]       [facility] [address]  [facility] [address]        Equal Access to Services     GREG LOCKETT : Hadii aad ku hadasho Soomaali, waaxda luqadaha, qaybta kaalmada adeegyada, waxay idiin hayaan adeeg karina hughes . So [facility] [phone].    ATENCIÓN: Si habla español, tiene a montemayor disposición servicios gratuitos de asistencia lingüística. Sana al [phone].    We comply with applicable federal civil rights laws and [place] laws. We do not discriminate on the basis of race, color, national origin, age, disability, sex, sexual orientation, or gender identity.            Thank you!     Thank you for choosing [facility] THERAP[facility]  for your care. Our goal is always to provide you with excellent care. Hearing back from our patients is one way we can continue to improve our services. Please take a few minutes to complete the written survey that you may receive in the mail after your visit with us. Thank you!             Your Updated Medication List - Protect others around you: Learn how to safely use, store and throw away your medicines at www.disposemymeds.org.          This list is accurate as of 5/22/18  1:41 PM.  Always use your  most recent med list.                   Brand Name Dispense Instructions for use Diagnosis    acetaminophen 325 MG tablet    TYLENOL    100 tablet    Take 1-2 tablets every 4 hours as needed for pain.    Status post total knee replacement, unspecified laterality       estradiol 0.05 MG/24HR BIW patch    VIVELLE-DOT     Place 1 patch onto the skin twice a week        glucosamine-chondroitin 500-400 MG Caps per capsule      Take 1 capsule by mouth 2 times daily        methocarbamol 750 MG tablet    ROBAXIN    35 tablet    Take 1 tablet (750 mg) by mouth 4 times daily as needed for muscle spasms    Status post total knee replacement, unspecified laterality       multivitamin, therapeutic with minerals Tabs tablet      Take 1 tablet by mouth daily        OMEGA-3 FISH OIL PO      Take 2 capsules by mouth 2 times daily        order for DME     1 each    Equipment being ordered: Crutches () Treatment Diagnosis: gait instablity    Status post total knee replacement, unspecified laterality       oxyCODONE IR 5 MG tablet    ROXICODONE    60 tablet    Take 1-2 tablets (5-10 mg) by mouth every 4 hours as needed for other (pain control or improvement in physical function. Hold dose for analgesic side effects.)    Status post total knee replacement, unspecified laterality       PROGESTERONE PO      Take 1 tablet by mouth daily        senna-docusate 8.6-50 MG per tablet    SENOKOT-S;PERICOLACE    50 tablet    Take 1-2 tablets by mouth 2 times daily Hold for loose stools. Discontinue as able when narcotic use is discontinued.    Status post total knee replacement, unspecified laterality       Vitamin D (Cholecalciferol) 1000 units Caps      Take 1,000 Units by mouth daily        warfarin 5 MG tablet    COUMADIN    45 tablet    Take 1 tablet today, tomorrow and Sunday. INR Monday. Additional instructions per anticoagulation clinic.    Status post total knee replacement, unspecified laterality

## 2018-06-01 ENCOUNTER — THERAPY VISIT (OUTPATIENT)
Dept: PHYSICAL THERAPY | Facility: CLINIC | Age: 63
End: 2018-06-01
Payer: COMMERCIAL

## 2018-06-01 DIAGNOSIS — M25.562 ACUTE PAIN OF LEFT KNEE: ICD-10-CM

## 2018-06-01 DIAGNOSIS — R60.0 LOCALIZED EDEMA: ICD-10-CM

## 2018-06-01 PROCEDURE — 97140 MANUAL THERAPY 1/> REGIONS: CPT | Mod: GP | Performed by: PHYSICAL THERAPIST

## 2018-06-01 PROCEDURE — 97110 THERAPEUTIC EXERCISES: CPT | Mod: GP | Performed by: PHYSICAL THERAPIST

## 2018-06-01 NOTE — MR AVS SNAPSHOT
After Visit Summary   6/1/2018    Agatha Fung    MRN: [id number]           Patient Information     Date Of Birth          1955        Visit Information        Provider Department      6/1/2018 3:20 PM Yane Elena, PT [facility]        Today's Diagnoses     Acute pain of left knee        Localized edema           Follow-ups after your visit        Your next 10 appointments already scheduled     Jun 05, 2018 11:30 AM CDT   (Arrive by 11:15 AM)   RETURN KNEE with Pamela Richter MD   [facility] ([facility])    [address]  4th Floor  [place] [id number] [phone]            Jun 25, 2018  9:30 AM CDT   TAINA Extremity with Yane Elena PT   [facility] ([facility]  )    [address]  [place] [id number] [phone]              Who to contact     If you have questions or need follow up information about today's clinic visit or your schedule please contact [facility] directly at [phone].  Normal or non-critical lab and imaging results will be communicated to you by MyChart, letter or phone within 4 business days after the clinic has received the results. If you do not hear from us within 7 days, please contact the clinic through [employer] or phone. If you have a critical or abnormal lab result, we will notify you by phone as soon as possible.  Submit refill requests through [employer] or call your pharmacy and they will forward the refill request to us. Please allow 3 business days for your refill to be completed.          Additional Information About Your Visit        MyChart Information     [employer] gives you secure access to your electronic health record. If you see a primary care provider, you can also send messages to your care team and make appointments. If you  have questions, please call your primary care clinic.  If you do not have a primary care provider, please call [phone] and they will assist you.        Care EveryWhere ID     This is your Care EveryWhere ID. This could be used by other organizations to access your [facility] medical records  [id number]         Blood Pressure from Last 3 Encounters:   03/30/18 119/65    Weight from Last 3 Encounters:   03/29/18 72.3 kg (159 lb 6.3 oz)   08/02/16 70.7 kg (155 lb 14.4 oz)              We Performed the Following     MANUAL THER TECH,1+REGIONS,EA 15 MIN     THERAPEUTIC EXERCISES        Primary Care Provider Office Phone # Fax #    Agatha Ashley Abraham [phone]       [facility] [address] [address]        Equal Access to Services     GREG LOCKETT : Hadii aad ku hadasho Soomaali, waaxda luqadaha, qaybta kaalmada adeegyada, laurel hughes . So [facility] [phone].    ATENCIÓN: Si habla español, tiene a montemayor disposición servicios gratuitos de asistencia lingüística. Sana al [phone].    We comply with applicable federal civil rights laws and [place] laws. We do not discriminate on the basis of race, color, national origin, age, disability, sex, sexual orientation, or gender identity.            Thank you!     Thank you for choosing [facility]  for your care. Our goal is always to provide you with excellent care. Hearing back from our patients is one way we can continue to improve our services. Please take a few minutes to complete the written survey that you may receive in the mail after your visit with us. Thank you!             Your Updated Medication List - Protect others around you: Learn how to safely use, store and throw away your medicines at www.disposemymeds.org.          This list is accurate as of 6/1/18  4:06 PM.  Always use your most recent med list.                   Brand Name Dispense  Instructions for use Diagnosis    acetaminophen 325 MG tablet    TYLENOL    100 tablet    Take 1-2 tablets every 4 hours as needed for pain.    Status post total knee replacement, unspecified laterality       estradiol 0.05 MG/24HR BIW patch    VIVELLE-DOT     Place 1 patch onto the skin twice a week        glucosamine-chondroitin 500-400 MG Caps per capsule      Take 1 capsule by mouth 2 times daily        methocarbamol 750 MG tablet    ROBAXIN    35 tablet    Take 1 tablet (750 mg) by mouth 4 times daily as needed for muscle spasms    Status post total knee replacement, unspecified laterality       multivitamin, therapeutic with minerals Tabs tablet      Take 1 tablet by mouth daily        OMEGA-3 FISH OIL PO      Take 2 capsules by mouth 2 times daily        order for DME     1 each    Equipment being ordered: Crutches () Treatment Diagnosis: gait instablity    Status post total knee replacement, unspecified laterality       oxyCODONE IR 5 MG tablet    ROXICODONE    60 tablet    Take 1-2 tablets (5-10 mg) by mouth every 4 hours as needed for other (pain control or improvement in physical function. Hold dose for analgesic side effects.)    Status post total knee replacement, unspecified laterality       PROGESTERONE PO      Take 1 tablet by mouth daily        senna-docusate 8.6-50 MG per tablet    SENOKOT-S;PERICOLACE    50 tablet    Take 1-2 tablets by mouth 2 times daily Hold for loose stools. Discontinue as able when narcotic use is discontinued.    Status post total knee replacement, unspecified laterality       Vitamin D (Cholecalciferol) 1000 units Caps      Take 1,000 Units by mouth daily        warfarin 5 MG tablet    COUMADIN    45 tablet    Take 1 tablet today, tomorrow and Sunday. INR Monday. Additional instructions per anticoagulation clinic.    Status post total knee replacement, unspecified laterality

## 2018-06-01 NOTE — PROGRESS NOTES
Subjective:  HPI                    Objective:  System    Physical Exam    General     ROS    Assessment/Plan:    PROGRESS  REPORT    Progress reporting period is from 5/22/18 to 6/1/18.       SUBJECTIVE  Subjective changes noted by patient:  Subjective: Sees MD on 6/5/18. Brought in post-op report and wanted to discuss findings. Able to sleep better and complete business trips. Has been walking for 4 miles.     Current pain level is 0/10  .     Previous pain level was  8/10 Initial Pain level: 8/10.   Changes in function:  Yes (See Goal flowsheet attached for changes in current functional level)  Adverse reaction to treatment or activity: None    OBJECTIVE  Changes noted in objective findings:  Yes,   Objective: PROM KF=0-0-121. Demonstrated edema management STM, quad stretching in prone. Discussed ex's to do at the gym.      ASSESSMENT/PLAN  Updated problem list and treatment plan: Diagnosis 1: L knee pain  Pain -  hot/cold therapy and manual therapy  Decreased ROM/flexibility - manual therapy and therapeutic exercise  Decreased joint mobility - manual therapy and therapeutic exercise  Decreased strength - therapeutic exercise and therapeutic activities  Impaired balance - neuro re-education and therapeutic activities  Edema - vasopneumatics  Impaired muscle performance - neuro re-education  Decreased function - therapeutic activities  STG/LTGs have been met or progress has been made towards goals:  Yes (See Goal flow sheet completed today.)  Assessment of Progress: The patient's condition is improving.  Self Management Plans:  Patient is independent in a home treatment program.  I have re-evaluated this patient and find that the nature, scope, duration and intensity of the therapy is appropriate for the medical condition of the patient.  Agatha continues to require the following intervention to meet STG and LTG's:  PT    Recommendations:  This patient would benefit from continued therapy.     Frequency:  1 X  week, once daily  Duration:  for 6 weeks        Please refer to the daily flowsheet for treatment today, total treatment time and time spent performing 1:1 timed codes.

## 2018-06-05 ENCOUNTER — RADIANT APPOINTMENT (OUTPATIENT)
Dept: GENERAL RADIOLOGY | Facility: CLINIC | Age: 63
End: 2018-06-05
Attending: ORTHOPAEDIC SURGERY
Payer: COMMERCIAL

## 2018-06-05 ENCOUNTER — OFFICE VISIT (OUTPATIENT)
Dept: ORTHOPEDICS | Facility: CLINIC | Age: 63
End: 2018-06-05
Payer: COMMERCIAL

## 2018-06-05 DIAGNOSIS — Z98.890 S/P LEFT KNEE SURGERY: ICD-10-CM

## 2018-06-05 DIAGNOSIS — M17.11 ARTHRITIS OF RIGHT KNEE: ICD-10-CM

## 2018-06-05 DIAGNOSIS — M17.12 ARTHRITIS OF LEFT KNEE: ICD-10-CM

## 2018-06-05 DIAGNOSIS — Z96.652 STATUS POST TOTAL LEFT KNEE REPLACEMENT: Primary | ICD-10-CM

## 2018-06-05 DIAGNOSIS — Z98.890 S/P LEFT KNEE SURGERY: Primary | ICD-10-CM

## 2018-06-05 RX ORDER — AMOXICILLIN 500 MG/1
2000 TABLET, FILM COATED ORAL ONCE
Qty: 4 TABLET | Refills: 1 | Status: SHIPPED | OUTPATIENT
Start: 2018-06-05 | End: 2018-06-05

## 2018-06-05 RX ORDER — ASPIRIN 325 MG
325 TABLET ORAL
COMMUNITY
End: 2019-08-06

## 2018-06-05 ASSESSMENT — ENCOUNTER SYMPTOMS
SWOLLEN GLANDS: 0
MUSCLE CRAMPS: 1
MUSCLE WEAKNESS: 1
BRUISES/BLEEDS EASILY: 1
MYALGIAS: 1
STIFFNESS: 1
BACK PAIN: 1
NECK PAIN: 0
ARTHRALGIAS: 1
JOINT SWELLING: 1

## 2018-06-05 NOTE — NURSING NOTE
Reason For Visit:   Chief Complaint   Patient presents with     Surgical Followup     10 week pop left TKA.  DOS: 3/29/2018       Primary MD: Agatha Triana  Referring MD: Self    ?  No  Occupation  at [employer].  Currently working? Yes.  Work status?  Full time.  Date of injury: ongoing    Date of surgery: 3/29/2018  Type of surgery: Left TKA.  Smoker: No  Request smoking cessation information: No    There were no vitals taken for this visit.    Pain Assessment  Patient Currently in Pain: Denies

## 2018-06-05 NOTE — LETTER
6/5/2018     RE: Agatha Fung  [address]  [place] [address]     Dear Colleague,    Thank you for referring your patient, Agatha Fung, to the [facility] at [facility]. Please see a copy of my visit note below.    HISTORY OF PRESENT ILLNESS:  Patient is a 62-year-old female who is now approximately 10 weeks status post left total knee replacement.  She is doing fantastic.  She goes to the gym, both at work and in a health club setting a few times a week.  She has biked 40 minutes indoors, 45 minutes outdoors.  She has walked her dogs up to 3 miles and she is used to 4 miles, but she has not extended it that far at this moment in time.  She is back to work full-time.  This involves travel and she has been on and off airplanes and racing through airports.      She reports her pain scale at 0.  She is a little bit disappointed in her lack of motion and that is what she would like to attend in the future.  Her best effort with physical therapy has been 121.  Important to note that the patient's intraoperative motion was 5-115.      PHYSICAL EXAMINATION:  Of patient's left knee reveals benign appearing incisions, good straight leg raising effort without a lag, some fullness about the knee without a fluid wave.  Range of motion today 0-118.  Stable to varus and valgus stressing at 0 and at 30 degrees.      X-rays were obtained and show excellent alignment.      ASSESSMENT:  Excellent postoperative results to date.      PLAN:  It is important to note that the patient has already past her preoperative motion.  I do believe that she will gain more motion over time.  I am not sure she will reach 130 which is her goal, but any motion that she had just at this point in time is greater than that which she had before surgery.      In the absence of problems, she will follow up with me at the 1 year julienne.  A long leg standing alignment film  without laterals or patellofemoral view will be done at that time.      This is a postop visit.       Answers for HPI/ROS submitted by the patient on 6/5/2018   General Symptoms: No  Skin Symptoms: No  HENT Symptoms: No  EYE SYMPTOMS: No  HEART SYMPTOMS: No  LUNG SYMPTOMS: No  INTESTINAL SYMPTOMS: No  URINARY SYMPTOMS: No  GYNECOLOGIC SYMPTOMS: No  BREAST SYMPTOMS: No  SKELETAL SYMPTOMS: Yes  BLOOD SYMPTOMS: Yes  NERVOUS SYSTEM SYMPTOMS: No  MENTAL HEALTH SYMPTOMS: No  Back pain: Yes  Muscle aches: Yes  Neck pain: No  Swollen joints: Yes  Joint pain: Yes  Bone pain: No  Muscle cramps: Yes  Muscle weakness: Yes  Joint stiffness: Yes  Bone fracture: No  Anemia: No  Swollen glands: No  Easy bleeding or bruising: Yes  Edema or swelling: Yes    Again, thank you for allowing me to participate in the care of your patient.      Sincerely,    Pamela Richter MD

## 2018-06-05 NOTE — PROGRESS NOTES
HISTORY OF PRESENT ILLNESS:  Patient is a 62-year-old female who is now approximately 10 weeks status post left total knee replacement.  She is doing fantastic.  She goes to the gym, both at work and in a health club setting a few times a week.  She has biked 40 minutes indoors, 45 minutes outdoors.  She has walked her dogs up to 3 miles and she is used to 4 miles, but she has not extended it that far at this moment in time.  She is back to work full-time.  This involves travel and she has been on and off airplanes and racing through airports.      She reports her pain scale at 0.  She is a little bit disappointed in her lack of motion and that is what she would like to attend in the future.  Her best effort with physical therapy has been 121.  Important to note that the patient's intraoperative motion was 5-115.      PHYSICAL EXAMINATION:  Of patient's left knee reveals benign appearing incisions, good straight leg raising effort without a lag, some fullness about the knee without a fluid wave.  Range of motion today 0-118.  Stable to varus and valgus stressing at 0 and at 30 degrees.      X-rays were obtained and show excellent alignment.      ASSESSMENT:  Excellent postoperative results to date.      PLAN:  It is important to note that the patient has already past her preoperative motion.  I do believe that she will gain more motion over time.  I am not sure she will reach 130 which is her goal, but any motion that she had just at this point in time is greater than that which she had before surgery.      In the absence of problems, she will follow up with me at the 1 year julienne.  A long leg standing alignment film without laterals or patellofemoral view will be done at that time.      This is a postop visit.       Answers for HPI/ROS submitted by the patient on 6/5/2018   General Symptoms: No  Skin Symptoms: No  HENT Symptoms: No  EYE SYMPTOMS: No  HEART SYMPTOMS: No  LUNG SYMPTOMS: No  INTESTINAL SYMPTOMS:  No  URINARY SYMPTOMS: No  GYNECOLOGIC SYMPTOMS: No  BREAST SYMPTOMS: No  SKELETAL SYMPTOMS: Yes  BLOOD SYMPTOMS: Yes  NERVOUS SYSTEM SYMPTOMS: No  MENTAL HEALTH SYMPTOMS: No  Back pain: Yes  Muscle aches: Yes  Neck pain: No  Swollen joints: Yes  Joint pain: Yes  Bone pain: No  Muscle cramps: Yes  Muscle weakness: Yes  Joint stiffness: Yes  Bone fracture: No  Anemia: No  Swollen glands: No  Easy bleeding or bruising: Yes  Edema or swelling: Yes

## 2018-06-05 NOTE — MR AVS SNAPSHOT
After Visit Summary   6/5/2018    Agatha Fung    MRN: [id number]           Patient Information     Date Of Birth          1955        Visit Information        Provider Department      6/5/2018 11:30 AM Pamela Richter MD [facility]        Today's Diagnoses     Status post total left knee replacement    -  1    Arthritis of right knee        Arthritis of left knee           Follow-ups after your visit        Your next 10 appointments already scheduled     Jun 25, 2018  9:30 AM CDT   TAINA Extremity with Yane Elena, PT   [facility] ([facility]  )    [address]  [place] [address] [phone]              Who to contact     Please call your clinic at [phone] to:    Ask questions about your health    Make or cancel appointments    Discuss your medicines    Learn about your test results    Speak to your doctor            Additional Information About Your Visit        MyChart Information     [employer] gives you secure access to your electronic health record. If you see a primary care provider, you can also send messages to your care team and make appointments. If you have questions, please call your primary care clinic.  If you do not have a primary care provider, please call [phone] and they will assist you.      [employer] is an electronic gateway that provides easy, online access to your medical records. With [employer], you can request a clinic appointment, read your test results, renew a prescription or communicate with your care team.     To access your existing account, please contact your [facility] or call [phone] for assistance.        Care EveryWhere ID     This is your Care EveryWhere ID. This could be used by other organizations to access your [facility] medical records  [id number]         Blood Pressure from Last 3 Encounters:   03/30/18 119/65     Weight from Last 3 Encounters:   03/29/18 72.3 kg (159 lb 6.3 oz)   08/02/16 70.7 kg (155 lb 14.4 oz)              Today, you had the following     No orders found for display         Today's Medication Changes          These changes are accurate as of 6/5/18 11:59 PM.  If you have any questions, ask your nurse or doctor.               Start taking these medicines.        Dose/Directions    amoxicillin 500 MG tablet   Commonly known as:  AMOXIL   Used for:  Status post total left knee replacement   Started by:  Pamela Richter MD        Dose:  2000 mg   Take 4 tablets (2,000 mg) by mouth once for 1 dose To be taken one hour prior to any dental cleaning or dental appointment.   Quantity:  4 tablet   Refills:  1            Where to get your medicines      These medications were sent to [place] [address] [place] [facility] [place], [place] - [address]  [address], [place] [address]     Phone:  [phone]     amoxicillin 500 MG tablet                Primary Care Provider Office Phone # Fax #    Agatha WYATT Ashley [facility] [phone]       [facility] [address]  [facility] [place] [facility] [address]        Equal Access to Services     GREG LOCKETT AH: Hadii aad ku hadasho Soomaali, waaxda luqadaha, qaybta kaalmada adeegyada, waxay rachnain hayuchen kia hughes ah. So [facility] [phone].    ATENCIÓN: Si habla español, tiene a montemayor disposición servicios gratuitos de asistencia lingüística. Llame al [phone].    We comply with applicable federal civil rights laws and [place] laws. We do not discriminate on the basis of race, color, national origin, age, disability, sex, sexual orientation, or gender identity.            Thank you!     Thank you for choosing [facility]  for your care. Our goal is always to provide you with excellent care. Hearing back from our patients is one way we can continue to improve our services. Please take a few minutes to complete the written survey that you may receive in  the mail after your visit with us. Thank you!             Your Updated Medication List - Protect others around you: Learn how to safely use, store and throw away your medicines at www.disposemymeds.org.          This list is accurate as of 6/5/18 11:59 PM.  Always use your most recent med list.                   Brand Name Dispense Instructions for use Diagnosis    acetaminophen 325 MG tablet    TYLENOL    100 tablet    Take 1-2 tablets every 4 hours as needed for pain.    Status post total knee replacement, unspecified laterality       amoxicillin 500 MG tablet    AMOXIL    4 tablet    Take 4 tablets (2,000 mg) by mouth once for 1 dose To be taken one hour prior to any dental cleaning or dental appointment.    Status post total left knee replacement       aspirin 325 MG tablet      Take 325 mg by mouth        estradiol 0.05 MG/24HR BIW patch    VIVELLE-DOT     Place 1 patch onto the skin twice a week        glucosamine-chondroitin 500-400 MG Caps per capsule      Take 1 capsule by mouth 2 times daily        methocarbamol 750 MG tablet    ROBAXIN    35 tablet    Take 1 tablet (750 mg) by mouth 4 times daily as needed for muscle spasms    Status post total knee replacement, unspecified laterality       multivitamin, therapeutic with minerals Tabs tablet      Take 1 tablet by mouth daily        OMEGA-3 FISH OIL PO      Take 2 capsules by mouth 2 times daily        order for DME     1 each    Equipment being ordered: Crutches () Treatment Diagnosis: gait instablity    Status post total knee replacement, unspecified laterality       oxyCODONE IR 5 MG tablet    ROXICODONE    60 tablet    Take 1-2 tablets (5-10 mg) by mouth every 4 hours as needed for other (pain control or improvement in physical function. Hold dose for analgesic side effects.)    Status post total knee replacement, unspecified laterality       PROGESTERONE PO      Take 1 tablet by mouth daily        senna-docusate 8.6-50 MG per tablet     SENOKOT-S;PERICOLACE    50 tablet    Take 1-2 tablets by mouth 2 times daily Hold for loose stools. Discontinue as able when narcotic use is discontinued.    Status post total knee replacement, unspecified laterality       Vitamin D (Cholecalciferol) 1000 units Caps      Take 1,000 Units by mouth daily        warfarin 5 MG tablet    COUMADIN    45 tablet    Take 1 tablet today, tomorrow and Sunday. INR Monday. Additional instructions per anticoagulation clinic.    Status post total knee replacement, unspecified laterality

## 2018-07-02 ENCOUNTER — THERAPY VISIT (OUTPATIENT)
Dept: PHYSICAL THERAPY | Facility: CLINIC | Age: 63
End: 2018-07-02
Payer: COMMERCIAL

## 2018-07-02 DIAGNOSIS — R60.0 LOCALIZED EDEMA: ICD-10-CM

## 2018-07-02 DIAGNOSIS — M25.562 ACUTE PAIN OF LEFT KNEE: ICD-10-CM

## 2018-07-02 PROCEDURE — 97140 MANUAL THERAPY 1/> REGIONS: CPT | Mod: GP | Performed by: PHYSICAL THERAPIST

## 2018-07-02 PROCEDURE — 97110 THERAPEUTIC EXERCISES: CPT | Mod: GP | Performed by: PHYSICAL THERAPIST

## 2018-07-23 ENCOUNTER — THERAPY VISIT (OUTPATIENT)
Dept: PHYSICAL THERAPY | Facility: CLINIC | Age: 63
End: 2018-07-23
Payer: COMMERCIAL

## 2018-07-23 DIAGNOSIS — R60.0 LOCALIZED EDEMA: ICD-10-CM

## 2018-07-23 DIAGNOSIS — M25.562 ACUTE PAIN OF LEFT KNEE: ICD-10-CM

## 2018-07-23 PROCEDURE — 97110 THERAPEUTIC EXERCISES: CPT | Mod: GP | Performed by: PHYSICAL THERAPIST

## 2018-07-23 PROCEDURE — 97140 MANUAL THERAPY 1/> REGIONS: CPT | Mod: GP | Performed by: PHYSICAL THERAPIST

## 2018-07-23 ASSESSMENT — ACTIVITIES OF DAILY LIVING (ADL)
WEAKNESS: THE SYMPTOM AFFECTS MY ACTIVITY SLIGHTLY
HOW_WOULD_YOU_RATE_THE_CURRENT_FUNCTION_OF_YOUR_KNEE_DURING_YOUR_USUAL_DAILY_ACTIVITIES_ON_A_SCALE_FROM_0_TO_100_WITH_100_BEING_YOUR_LEVEL_OF_KNEE_FUNCTION_PRIOR_TO_YOUR_INJURY_AND_0_BEING_THE_INABILITY_TO_PERFORM_ANY_OF_YOUR_USUAL_DAILY_ACTIVITIES?: 80
RAW_SCORE: 45
WALK: ACTIVITY IS MINIMALLY DIFFICULT
GO UP STAIRS: ACTIVITY IS MINIMALLY DIFFICULT
GIVING WAY, BUCKLING OR SHIFTING OF KNEE: THE SYMPTOM AFFECTS MY ACTIVITY SLIGHTLY
SWELLING: THE SYMPTOM AFFECTS MY ACTIVITY MODERATELY
SIT WITH YOUR KNEE BENT: ACTIVITY IS NOT DIFFICULT
KNEEL ON THE FRONT OF YOUR KNEE: ACTIVITY IS FAIRLY DIFFICULT
LIMPING: THE SYMPTOM AFFECTS MY ACTIVITY SLIGHTLY
SQUAT: ACTIVITY IS FAIRLY DIFFICULT
STAND: ACTIVITY IS NOT DIFFICULT
KNEE_ACTIVITY_OF_DAILY_LIVING_SCORE: 64.29
PAIN: THE SYMPTOM AFFECTS MY ACTIVITY SLIGHTLY
STIFFNESS: THE SYMPTOM AFFECTS MY ACTIVITY MODERATELY
AS_A_RESULT_OF_YOUR_KNEE_INJURY,_HOW_WOULD_YOU_RATE_YOUR_CURRENT_LEVEL_OF_DAILY_ACTIVITY?: NEARLY NORMAL
KNEE_ACTIVITY_OF_DAILY_LIVING_SUM: 45
HOW_WOULD_YOU_RATE_THE_OVERALL_FUNCTION_OF_YOUR_KNEE_DURING_YOUR_USUAL_DAILY_ACTIVITIES?: NEARLY NORMAL
GO DOWN STAIRS: ACTIVITY IS SOMEWHAT DIFFICULT
RISE FROM A CHAIR: ACTIVITY IS MINIMALLY DIFFICULT

## 2018-08-11 ENCOUNTER — THERAPY VISIT (OUTPATIENT)
Dept: PHYSICAL THERAPY | Facility: CLINIC | Age: 63
End: 2018-08-11
Payer: COMMERCIAL

## 2018-08-11 DIAGNOSIS — R60.0 LOCALIZED EDEMA: ICD-10-CM

## 2018-08-11 DIAGNOSIS — M25.562 ACUTE PAIN OF LEFT KNEE: ICD-10-CM

## 2018-08-11 PROCEDURE — 97140 MANUAL THERAPY 1/> REGIONS: CPT | Mod: GP | Performed by: PHYSICAL THERAPIST

## 2018-08-11 PROCEDURE — 97110 THERAPEUTIC EXERCISES: CPT | Mod: GP | Performed by: PHYSICAL THERAPIST

## 2018-08-11 NOTE — MR AVS SNAPSHOT
After Visit Summary   8/11/2018    Agatha Fung    MRN: [id number]           Patient Information     Date Of Birth          1955        Visit Information        Provider Department      8/11/2018 9:50 AM Yane Elena, PT [place] [facility]        Today's Diagnoses     Acute pain of left knee        Localized edema           Follow-ups after your visit        Your next 10 appointments already scheduled     Aug 29, 2018 11:30 AM CDT   TAINA Extremity with Yane Elena PT   [facility] (TA[facility]pto  )    [address]  [facility] [facility] [address] [phone]              Who to contact     If you have questions or need follow up information about today's clinic visit or your schedule please contact [place] [facility]ERAP[facility] directly at [phone].  Normal or non-critical lab and imaging results will be communicated to you by Technical Machinehart, letter or phone within 4 business days after the clinic has received the results. If you do not hear from us within 7 days, please contact the clinic through [employer] or phone. If you have a critical or abnormal lab result, we will notify you by phone as soon as possible.  Submit refill requests through Operating [employer] or call your pharmacy and they will forward the refill request to us. Please allow 3 business days for your refill to be completed.          Additional Information About Your Visit        MyChart Information     [employer] gives you secure access to your electronic health record. If you see a primary care provider, you can also send messages to your care team and make appointments. If you have questions, please call your primary care clinic.  If you do not have a primary care provider, please call [phone] and they will assist you.        Care EveryWhere ID     This is your Care EveryWhere ID. This could be used by other  organizations to access your [facility] medical records  [id number]         Blood Pressure from Last 3 Encounters:   03/30/18 119/65    Weight from Last 3 Encounters:   03/29/18 72.3 kg (159 lb 6.3 oz)   08/02/16 70.7 kg (155 lb 14.4 oz)              We Performed the Following     TAINA PROGRESS NOTES REPORT     MANUAL THER TECH,1+REGIONS,EA 15 MIN     THERAPEUTIC EXERCISES        Primary Care Provider Office Phone # Fax #    Agatha King [phone]       [facility] [address]  [facility] [address]        Equal Access to Services     [facility] Hospital[facility]ANIE : Hadii aad ku hadasho Soomaali, waaxda luqadaha, qaybta kaalmada adeegyada, waxay idiin hayaan adeeg karina hughes . So [facility] [phone].    ATENCIÓN: Si habla español, tiene a montemayor disposición servicios gratuitos de asistencia lingüística. Llame al [phone].    We comply with applicable federal civil rights laws and [place] laws. We do not discriminate on the basis of race, color, national origin, age, disability, sex, sexual orientation, or gender identity.            Thank you!     Thank you for choosing [facility]  for your care. Our goal is always to provide you with excellent care. Hearing back from our patients is one way we can continue to improve our services. Please take a few minutes to complete the written survey that you may receive in the mail after your visit with us. Thank you!             Your Updated Medication List - Protect others around you: Learn how to safely use, store and throw away your medicines at www.disposemymeds.org.          This list is accurate as of 8/11/18 10:51 AM.  Always use your most recent med list.                   Brand Name Dispense Instructions for use Diagnosis    acetaminophen 325 MG tablet    TYLENOL    100 tablet    Take 1-2 tablets every 4 hours as needed for pain.    Status post total knee replacement, unspecified laterality        aspirin 325 MG tablet      Take 325 mg by mouth        estradiol 0.05 MG/24HR BIW patch    VIVELLE-DOT     Place 1 patch onto the skin twice a week        glucosamine-chondroitin 500-400 MG Caps per capsule      Take 1 capsule by mouth 2 times daily        methocarbamol 750 MG tablet    ROBAXIN    35 tablet    Take 1 tablet (750 mg) by mouth 4 times daily as needed for muscle spasms    Status post total knee replacement, unspecified laterality       multivitamin, therapeutic with minerals Tabs tablet      Take 1 tablet by mouth daily        OMEGA-3 FISH OIL PO      Take 2 capsules by mouth 2 times daily        order for DME     1 each    Equipment being ordered: Crutches () Treatment Diagnosis: gait instablity    Status post total knee replacement, unspecified laterality       oxyCODONE IR 5 MG tablet    ROXICODONE    60 tablet    Take 1-2 tablets (5-10 mg) by mouth every 4 hours as needed for other (pain control or improvement in physical function. Hold dose for analgesic side effects.)    Status post total knee replacement, unspecified laterality       PROGESTERONE PO      Take 1 tablet by mouth daily        senna-docusate 8.6-50 MG per tablet    SENOKOT-S;PERICOLACE    50 tablet    Take 1-2 tablets by mouth 2 times daily Hold for loose stools. Discontinue as able when narcotic use is discontinued.    Status post total knee replacement, unspecified laterality       Vitamin D (Cholecalciferol) 1000 units Caps      Take 1,000 Units by mouth daily        warfarin 5 MG tablet    COUMADIN    45 tablet    Take 1 tablet today, tomorrow and Sunday. INR Monday. Additional instructions per anticoagulation clinic.    Status post total knee replacement, unspecified laterality

## 2018-08-11 NOTE — PROGRESS NOTES
Subjective:  HPI                    Objective:  System    Physical Exam    General     ROS    Assessment/Plan:    PROGRESS  REPORT    Progress reporting period is from 6/1/2018 to 8/10/2018.       SUBJECTIVE  Subjective changes noted by patient:  .  Subjective: Has walked two days in a row but noted increased swelling by an inch after a 4 mile walk.  Still has stiffness and soreness with bending.  Can bike without pain.  Pain in the lateral knee.      Current pain level is  Current Pain level: 2/10 (worse on steps).     Previous pain level was   Initial Pain level: 8/10.   Changes in function:  Yes (See Goal flowsheet attached for changes in current functional level)  Adverse reaction to treatment or activity: None    OBJECTIVE  Changes noted in objective findings:  Yes,   Objective: 39.5 cm at joint line.  PROM flexion to 115 after quad stretch.  120 after soft tissue.mobilization.  Most swelling in the lateral joint and lateral suprapatellar region.  Discussed trying to put compression sleeve on the knee after exercise to help with swelling.       ASSESSMENT/PLAN  Updated problem list and treatment plan: Diagnosis 1:  Left TKA  Pain -  manual therapy, self management, education and home program  Decreased ROM/flexibility - manual therapy, therapeutic exercise and home program  Decreased strength - therapeutic exercise, therapeutic activities and home program  Edema - self management/home program  Decreased function - therapeutic activities and home program  STG/LTGs have been met or progress has been made towards goals:  Yes (See Goal flow sheet completed today.)  Assessment of Progress: The patient's condition is improving.  The patient's condition has potential to improve.  Self Management Plans:  Patient has been instructed in a home treatment program.  I have re-evaluated this patient and find that the nature, scope, duration and intensity of the therapy is appropriate for the medical condition of the  patientAngelique Snider continues to require the following intervention to meet STG and LTG's:  PT    Recommendations:  This patient would benefit from continued therapy.     Frequency:  1 X a month, once daily  Duration:  for 2 months        Please refer to the daily flowsheet for treatment today, total treatment time and time spent performing 1:1 timed codes.

## 2018-08-29 ENCOUNTER — THERAPY VISIT (OUTPATIENT)
Dept: PHYSICAL THERAPY | Facility: CLINIC | Age: 63
End: 2018-08-29
Payer: COMMERCIAL

## 2018-08-29 DIAGNOSIS — R60.0 LOCALIZED EDEMA: ICD-10-CM

## 2018-08-29 DIAGNOSIS — M25.562 ACUTE PAIN OF LEFT KNEE: ICD-10-CM

## 2018-08-29 PROCEDURE — 97140 MANUAL THERAPY 1/> REGIONS: CPT | Mod: GP | Performed by: PHYSICAL THERAPIST

## 2018-08-29 PROCEDURE — 97110 THERAPEUTIC EXERCISES: CPT | Mod: GP | Performed by: PHYSICAL THERAPIST

## 2018-08-29 PROCEDURE — 97112 NEUROMUSCULAR REEDUCATION: CPT | Mod: GP | Performed by: PHYSICAL THERAPIST

## 2018-09-24 ENCOUNTER — THERAPY VISIT (OUTPATIENT)
Dept: PHYSICAL THERAPY | Facility: CLINIC | Age: 63
End: 2018-09-24
Payer: COMMERCIAL

## 2018-09-24 DIAGNOSIS — M25.562 ACUTE PAIN OF LEFT KNEE: ICD-10-CM

## 2018-09-24 DIAGNOSIS — R60.0 LOCALIZED EDEMA: ICD-10-CM

## 2018-09-24 PROBLEM — R60.9 EDEMA: Status: RESOLVED | Noted: 2018-04-02 | Resolved: 2018-09-24

## 2018-09-24 PROCEDURE — 97110 THERAPEUTIC EXERCISES: CPT | Mod: GP | Performed by: PHYSICAL THERAPIST

## 2018-09-24 PROCEDURE — 97112 NEUROMUSCULAR REEDUCATION: CPT | Mod: GP | Performed by: PHYSICAL THERAPIST

## 2018-09-24 NOTE — PROGRESS NOTES
Subjective:  HPI                    Objective:  System    Physical Exam    General     ROS    Assessment/Plan:    DISCHARGE REPORT    Progress reporting period is from 8/11/2018 to 9/24/2018.       SUBJECTIVE  Subjective changes noted by patient:  .  Subjective: Walking 4 miles and less pain with this.  Standing for 4 hours irritated.  Stairs are fine.  Wore compression staockings on flights.      Current pain level is  Current Pain level: 0/10.     Previous pain level was   Initial Pain level: 8/10.   Changes in function:  Yes (See Goal flowsheet attached for changes in current functional level)  Adverse reaction to treatment or activity: None    OBJECTIVE  Changes noted in objective findings:  Yes,   Objective: Swelling better at joint line, 39.2cm.  Quad stretch felt with stretch in prone.  123 flexion in prone, 127 in kneeling, 125 in supine.  Adding weights with all strenthening.       ASSESSMENT/PLAN  Updated problem list and treatment plan: Diagnosis 1:  Left TKA  Pain -  manual therapy, self management, education and home program  Decreased ROM/flexibility - manual therapy, therapeutic exercise and home program  Decreased joint mobility - manual therapy, therapeutic exercise and home program  Decreased strength - therapeutic exercise, therapeutic activities and home program  Edema - vasopneumatics, cold therapy and self management/home program  Decreased function - therapeutic activities and home program  STG/LTGs have been met or progress has been made towards goals:  Yes (See Goal flow sheet completed today.)  Assessment of Progress: The patient's condition is improving.  The patient's condition has potential to improve.  Self Management Plans:  Patient has been instructed in a home treatment program.    Agatha continues to require the following intervention to meet STG and LTG's:  Patient needs to continue to work on the home exercise program.      Recommendations:  This patient is ready to be discharged  from therapy and continue their home treatment program.    Please refer to the daily flowsheet for treatment today, total treatment time and time spent performing 1:1 timed codes.

## 2018-09-24 NOTE — MR AVS SNAPSHOT
After Visit Summary   9/24/2018    Agatha Fung    MRN: [id number]           Patient Information     Date Of Birth          1955        Visit Information        Provider Department      9/24/2018 3:20 PM Yane Elena PT [facility]        Today's Diagnoses     Acute pain of left knee        Localized edema           Follow-ups after your visit        Who to contact     If you have questions or need follow up information about today's clinic visit or your schedule please contact [facility] PHYSICAL [facility] directly at [phone].  Normal or non-critical lab and imaging results will be communicated to you by SiphonLabshart, letter or phone within 4 business days after the clinic has received the results. If you do not hear from us within 7 days, please contact the clinic through SiphonLabshart or phone. If you have a critical or abnormal lab result, we will notify you by phone as soon as possible.  Submit refill requests through HuTerra or call your pharmacy and they will forward the refill request to us. Please allow 3 business days for your refill to be completed.          Additional Information About Your Visit        MyChart Information     [employer] gives you secure access to your electronic health record. If you see a primary care provider, you can also send messages to your care team and make appointments. If you have questions, please call your primary care clinic.  If you do not have a primary care provider, please call [phone] and they will assist you.        Care EveryWhere ID     This is your Care EveryWhere ID. This could be used by other organizations to access your [place] medical records  [id number]         Blood Pressure from Last 3 Encounters:   03/30/18 119/65    Weight from Last 3 Encounters:   03/29/18 72.3 kg (159 lb 6.3 oz)   08/02/16 70.7 kg (155 lb 14.4 oz)              We Performed the  Following     TAINA PROGRESS NOTES REPORT     NEUROMUSCULAR RE-EDUCATION     THERAPEUTIC EXERCISES        Primary Care Provider Office Phone # Fax #    Agatha King [phone]       [facility] [address] [facility] [address]        Equal Access to Services     LATESHAGRACE LEVY : Hadii aad ku hadasho Soomaali, waaxda luqadaha, qaybta kaalmada adeegyada, waxay idiin hayaan adeeg khsamuel lataqueriavanna feliciano. So [facility] [phone].    ATENCIÓN: Si habla español, tiene a montemayor disposición servicios gratuitos de asistencia lingüística. Llame al [phone].    We comply with applicable federal civil rights laws and [place] laws. We do not discriminate on the basis of race, color, national origin, age, disability, sex, sexual orientation, or gender identity.            Thank you!     Thank you for choosing [place] [facility] PHYSICAL THERAPY  for your care. Our goal is always to provide you with excellent care. Hearing back from our patients is one way we can continue to improve our services. Please take a few minutes to complete the written survey that you may receive in the mail after your visit with us. Thank you!             Your Updated Medication List - Protect others around you: Learn how to safely use, store and throw away your medicines at www.disposemymeds.org.          This list is accurate as of 9/24/18  4:10 PM.  Always use your most recent med list.                   Brand Name Dispense Instructions for use Diagnosis    acetaminophen 325 MG tablet    TYLENOL    100 tablet    Take 1-2 tablets every 4 hours as needed for pain.    Status post total knee replacement, unspecified laterality       aspirin 325 MG tablet      Take 325 mg by mouth        estradiol 0.05 MG/24HR BIW patch    VIVELLE-DOT     Place 1 patch onto the skin twice a week        glucosamine-chondroitin 500-400 MG Caps per capsule      Take 1 capsule by mouth 2 times daily        methocarbamol 750 MG  tablet    ROBAXIN    35 tablet    Take 1 tablet (750 mg) by mouth 4 times daily as needed for muscle spasms    Status post total knee replacement, unspecified laterality       multivitamin, therapeutic with minerals Tabs tablet      Take 1 tablet by mouth daily        OMEGA-3 FISH OIL PO      Take 2 capsules by mouth 2 times daily        order for DME     1 each    Equipment being ordered: Crutches () Treatment Diagnosis: gait instablity    Status post total knee replacement, unspecified laterality       oxyCODONE IR 5 MG tablet    ROXICODONE    60 tablet    Take 1-2 tablets (5-10 mg) by mouth every 4 hours as needed for other (pain control or improvement in physical function. Hold dose for analgesic side effects.)    Status post total knee replacement, unspecified laterality       PROGESTERONE PO      Take 1 tablet by mouth daily        senna-docusate 8.6-50 MG per tablet    SENOKOT-S;PERICOLACE    50 tablet    Take 1-2 tablets by mouth 2 times daily Hold for loose stools. Discontinue as able when narcotic use is discontinued.    Status post total knee replacement, unspecified laterality       Vitamin D (Cholecalciferol) 1000 units Caps      Take 1,000 Units by mouth daily        warfarin 5 MG tablet    COUMADIN    45 tablet    Take 1 tablet today, tomorrow and Sunday. INR Monday. Additional instructions per anticoagulation clinic.    Status post total knee replacement, unspecified laterality

## 2018-10-04 ENCOUNTER — TELEPHONE (OUTPATIENT)
Dept: ORTHOPEDICS | Facility: CLINIC | Age: 63
End: 2018-10-04

## 2018-10-04 NOTE — TELEPHONE ENCOUNTER
[facility] Call Center    Phone Message    May a detailed message be left on voicemail: yes    Reason for Call: Other: Pt of Dr. Richter called to follow up on forms she had sent over for Dr. Richter to sign to give her clearance to scuba dive. Pt is leaving in 2 weeks, and would like these forms by the end of next week, or sooner, if possible. Pt stated she will be out of the office for the next few days, and will not be able to receive a fax, and requests these either be mailed to her, or emailed in a PDF format. Pt's mailing address: [address] [place] [phone], Email: [email or web address]     Action Taken: Message routed to:  [facility] ([facility] Orthopedics

## 2018-10-15 NOTE — TELEPHONE ENCOUNTER
[facility] Call Center    Phone Message    May a detailed message be left on voicemail: yes    Reason for Call: Other: Pt called stating she never got call and does not know if message was responded to on 10/04. She needs the forms sent by Thursday as she is going on Friday. PT is requesting call back from Dr. Richter nurse to discuss.      Action Taken: Message routed to:  [facility] ([facility] Ortho

## 2019-02-28 ENCOUNTER — DOCUMENTATION ONLY (OUTPATIENT)
Dept: CARE COORDINATION | Facility: CLINIC | Age: 64
End: 2019-02-28

## 2019-04-11 DIAGNOSIS — Z96.652 S/P TOTAL KNEE ARTHROPLASTY, LEFT: Primary | ICD-10-CM

## 2019-04-16 ENCOUNTER — OFFICE VISIT (OUTPATIENT)
Dept: ORTHOPEDICS | Facility: CLINIC | Age: 64
End: 2019-04-16
Payer: COMMERCIAL

## 2019-04-16 ENCOUNTER — ANCILLARY PROCEDURE (OUTPATIENT)
Dept: GENERAL RADIOLOGY | Facility: CLINIC | Age: 64
End: 2019-04-16
Attending: ORTHOPAEDIC SURGERY
Payer: COMMERCIAL

## 2019-04-16 DIAGNOSIS — Z98.890 S/P KNEE SURGERY: Primary | ICD-10-CM

## 2019-04-16 DIAGNOSIS — Z96.652 S/P TOTAL KNEE ARTHROPLASTY, LEFT: ICD-10-CM

## 2019-04-16 ASSESSMENT — ENCOUNTER SYMPTOMS
STIFFNESS: 1
MYALGIAS: 1
BACK PAIN: 1

## 2019-04-16 NOTE — LETTER
RE: Agatha Fung  [address]  [facility] [address]     Dear Colleague,    Thank you for referring your patient, Agatha Fung, to the [facility]. Please see a copy of my visit note below.    HISTORY OF PRESENT ILLNESS:  Patient is a pleasant 63-year-old female who is now 1 year status post a left total knee replacement.  She is overall happy with her results to date, but does report continued stiffness and she desires to have more motion.  However, she does feel that this is more of a nuisance and that she could live with this discomfort.      She works in a job that mandates a lot of travel.  She has been able to go back to work without issues.      She does report that she notices the discomfort mostly when she is going down steps.  She does notice that the knee swells, at least feels stiff at the end of the day.      PHYSICAL EXAMINATION:  Physical exam of patient's left knee reveals benign appearing incisions, no swelling today.  Good straight leg raising effort without a lag, range of motion 0-116 compared to 130 on the opposite right knee.      Important to note that the patient did have range of motion 5 degrees to 115 degrees  under anesthesia.  She also had 115 degrees of flexion when I saw her at the 6 month julienne.      ASSESSMENT:  The patient might benefit from a mini open lysis of adhesions.  I think if she got 5 or 10 degrees more motion, she would like her knee better.  However, she must keep in mind that she is already at or greater than her preoperative motion under anesthesia.      I went through the time of what it would mean for her to have a mini open lysis of adhesions.  She will take this under consideration and contact me accordingly.      She is on estradiol and has been for over 10 years.  Preoperatively, if we proceed with a mini open lysis of adhesions, she can stay on this medication and use  aspirin for a short time postoperatively.  All questions were answered.      Room time 25 minutes, consultation time 20.     Again, thank you for allowing me to participate in the care of your patient.      Sincerely,    Pamela Richter MD

## 2019-04-16 NOTE — NURSING NOTE
Reason For Visit:   Chief Complaint   Patient presents with     RECHECK     Left TKA 1 year follow up DOS 3/29/18       Primary MD: Agatha Triana    ?  No  Occupation: .  Currently working? Yes.  Work status?  Full time.  Date of surgery: 3/29/18  Type of surgery: Left TKA.  Smoker: No  Request smoking cessation information: No    There were no vitals taken for this visit.    Pain Assessment  Patient Currently in Pain: Yes  0-10 Pain Scale: 3  Primary Pain Location: Knee(Left)          Michelle Bowman ATC

## 2019-04-17 NOTE — PROGRESS NOTES
HISTORY OF PRESENT ILLNESS:  Patient is a pleasant 63-year-old female who is now 1 year status post a left total knee replacement.  She is overall happy with her results to date, but does report continued stiffness and she desires to have more motion.  However, she does feel that this is more of a nuisance and that she could live with this discomfort.      She works in a job that mandates a lot of travel.  She has been able to go back to work without issues.      She does report that she notices the discomfort mostly when she is going down steps.  She does notice that the knee swells, at least feels stiff at the end of the day.      PHYSICAL EXAMINATION:  Physical exam of patient's left knee reveals benign appearing incisions, no swelling today.  Good straight leg raising effort without a lag, range of motion 0-116 compared to 130 on the opposite right knee.      Important to note that the patient did have range of motion 5 degrees to 115 degrees  under anesthesia.  She also had 115 degrees of flexion when I saw her at the 6 month julienne.      ASSESSMENT:  The patient might benefit from a mini open lysis of adhesions.  I think if she got 5 or 10 degrees more motion, she would like her knee better.  However, she must keep in mind that she is already at or greater than her preoperative motion under anesthesia.      I went through the time of what it would mean for her to have a mini open lysis of adhesions.  She will take this under consideration and contact me accordingly.      She is on estradiol and has been for over 10 years.  Preoperatively, if we proceed with a mini open lysis of adhesions, she can stay on this medication and use aspirin for a short time postoperatively.  All questions were answered.      Room time 25 minutes, consultation time 20.

## 2019-08-08 ENCOUNTER — ANESTHESIA EVENT (OUTPATIENT)
Dept: SURGERY | Facility: AMBULATORY SURGERY CENTER | Age: 64
End: 2019-08-08

## 2019-08-09 ENCOUNTER — HOSPITAL ENCOUNTER (OUTPATIENT)
Facility: AMBULATORY SURGERY CENTER | Age: 64
End: 2019-08-09
Attending: ORTHOPAEDIC SURGERY
Payer: COMMERCIAL

## 2019-08-09 ENCOUNTER — ANESTHESIA (OUTPATIENT)
Dept: SURGERY | Facility: AMBULATORY SURGERY CENTER | Age: 64
End: 2019-08-09

## 2019-08-09 VITALS
DIASTOLIC BLOOD PRESSURE: 73 MMHG | TEMPERATURE: 97.7 F | SYSTOLIC BLOOD PRESSURE: 114 MMHG | HEIGHT: 67 IN | HEART RATE: 69 BPM | WEIGHT: 160 LBS | OXYGEN SATURATION: 99 % | RESPIRATION RATE: 16 BRPM | BODY MASS INDEX: 25.11 KG/M2

## 2019-08-09 DIAGNOSIS — M25.669 KNEE STIFFNESS, UNSPECIFIED LATERALITY: Primary | ICD-10-CM

## 2019-08-09 RX ORDER — SODIUM CHLORIDE, SODIUM LACTATE, POTASSIUM CHLORIDE, CALCIUM CHLORIDE 600; 310; 30; 20 MG/100ML; MG/100ML; MG/100ML; MG/100ML
INJECTION, SOLUTION INTRAVENOUS CONTINUOUS
Status: DISCONTINUED | OUTPATIENT
Start: 2019-08-09 | End: 2019-08-10 | Stop reason: HOSPADM

## 2019-08-09 RX ORDER — OXYCODONE HYDROCHLORIDE 5 MG/1
5-10 TABLET ORAL EVERY 4 HOURS PRN
Qty: 10 TABLET | Refills: 0 | Status: SHIPPED | OUTPATIENT
Start: 2019-08-09

## 2019-08-09 RX ORDER — ONDANSETRON 4 MG/1
4 TABLET, ORALLY DISINTEGRATING ORAL EVERY 30 MIN PRN
Status: DISCONTINUED | OUTPATIENT
Start: 2019-08-09 | End: 2019-08-10 | Stop reason: HOSPADM

## 2019-08-09 RX ORDER — LIDOCAINE HYDROCHLORIDE 20 MG/ML
INJECTION, SOLUTION INFILTRATION; PERINEURAL PRN
Status: DISCONTINUED | OUTPATIENT
Start: 2019-08-09 | End: 2019-08-09

## 2019-08-09 RX ORDER — GABAPENTIN 300 MG/1
300 CAPSULE ORAL ONCE
Status: COMPLETED | OUTPATIENT
Start: 2019-08-09 | End: 2019-08-09

## 2019-08-09 RX ORDER — ONDANSETRON 4 MG/1
4-8 TABLET, ORALLY DISINTEGRATING ORAL EVERY 8 HOURS PRN
Qty: 4 TABLET | Refills: 0 | Status: SHIPPED | OUTPATIENT
Start: 2019-08-09

## 2019-08-09 RX ORDER — SODIUM CHLORIDE, SODIUM LACTATE, POTASSIUM CHLORIDE, CALCIUM CHLORIDE 600; 310; 30; 20 MG/100ML; MG/100ML; MG/100ML; MG/100ML
INJECTION, SOLUTION INTRAVENOUS CONTINUOUS
Status: DISCONTINUED | OUTPATIENT
Start: 2019-08-09 | End: 2019-08-09 | Stop reason: HOSPADM

## 2019-08-09 RX ORDER — ONDANSETRON 2 MG/ML
INJECTION INTRAMUSCULAR; INTRAVENOUS PRN
Status: DISCONTINUED | OUTPATIENT
Start: 2019-08-09 | End: 2019-08-09

## 2019-08-09 RX ORDER — DEXAMETHASONE SODIUM PHOSPHATE 4 MG/ML
INJECTION, SOLUTION INTRA-ARTICULAR; INTRALESIONAL; INTRAMUSCULAR; INTRAVENOUS; SOFT TISSUE PRN
Status: DISCONTINUED | OUTPATIENT
Start: 2019-08-09 | End: 2019-08-09

## 2019-08-09 RX ORDER — ACETAMINOPHEN 325 MG/1
975 TABLET ORAL ONCE
Status: COMPLETED | OUTPATIENT
Start: 2019-08-09 | End: 2019-08-09

## 2019-08-09 RX ORDER — FLUMAZENIL 0.1 MG/ML
0.2 INJECTION, SOLUTION INTRAVENOUS
Status: DISCONTINUED | OUTPATIENT
Start: 2019-08-09 | End: 2019-08-09 | Stop reason: HOSPADM

## 2019-08-09 RX ORDER — NALOXONE HYDROCHLORIDE 0.4 MG/ML
.1-.4 INJECTION, SOLUTION INTRAMUSCULAR; INTRAVENOUS; SUBCUTANEOUS
Status: DISCONTINUED | OUTPATIENT
Start: 2019-08-09 | End: 2019-08-10 | Stop reason: HOSPADM

## 2019-08-09 RX ORDER — PREDNISONE 5 MG/1
5 TABLET ORAL DAILY
Qty: 70 TABLET | Refills: 0 | Status: SHIPPED | OUTPATIENT
Start: 2019-08-09

## 2019-08-09 RX ORDER — FENTANYL CITRATE 50 UG/ML
25-50 INJECTION, SOLUTION INTRAMUSCULAR; INTRAVENOUS
Status: DISCONTINUED | OUTPATIENT
Start: 2019-08-09 | End: 2019-08-09 | Stop reason: HOSPADM

## 2019-08-09 RX ORDER — PROPOFOL 10 MG/ML
INJECTION, EMULSION INTRAVENOUS PRN
Status: DISCONTINUED | OUTPATIENT
Start: 2019-08-09 | End: 2019-08-09

## 2019-08-09 RX ORDER — ASPIRIN 325 MG
325 TABLET ORAL DAILY
Qty: 30 TABLET | Refills: 0 | Status: SHIPPED | OUTPATIENT
Start: 2019-08-09

## 2019-08-09 RX ORDER — MEPERIDINE HYDROCHLORIDE 25 MG/ML
12.5 INJECTION INTRAMUSCULAR; INTRAVENOUS; SUBCUTANEOUS
Status: DISCONTINUED | OUTPATIENT
Start: 2019-08-09 | End: 2019-08-10 | Stop reason: HOSPADM

## 2019-08-09 RX ORDER — BUPIVACAINE HYDROCHLORIDE 2.5 MG/ML
INJECTION, SOLUTION EPIDURAL; INFILTRATION; INTRACAUDAL PRN
Status: DISCONTINUED | OUTPATIENT
Start: 2019-08-09 | End: 2019-08-09

## 2019-08-09 RX ORDER — OXYCODONE HYDROCHLORIDE 5 MG/1
5 TABLET ORAL EVERY 4 HOURS PRN
Status: DISCONTINUED | OUTPATIENT
Start: 2019-08-09 | End: 2019-08-10 | Stop reason: HOSPADM

## 2019-08-09 RX ORDER — ONDANSETRON 2 MG/ML
4 INJECTION INTRAMUSCULAR; INTRAVENOUS EVERY 30 MIN PRN
Status: DISCONTINUED | OUTPATIENT
Start: 2019-08-09 | End: 2019-08-10 | Stop reason: HOSPADM

## 2019-08-09 RX ORDER — LIDOCAINE 40 MG/G
CREAM TOPICAL
Status: DISCONTINUED | OUTPATIENT
Start: 2019-08-09 | End: 2019-08-09 | Stop reason: HOSPADM

## 2019-08-09 RX ORDER — KETOROLAC TROMETHAMINE 30 MG/ML
30 INJECTION, SOLUTION INTRAMUSCULAR; INTRAVENOUS EVERY 6 HOURS PRN
Status: DISCONTINUED | OUTPATIENT
Start: 2019-08-09 | End: 2019-08-10 | Stop reason: HOSPADM

## 2019-08-09 RX ORDER — PROPOFOL 10 MG/ML
INJECTION, EMULSION INTRAVENOUS CONTINUOUS PRN
Status: DISCONTINUED | OUTPATIENT
Start: 2019-08-09 | End: 2019-08-09

## 2019-08-09 RX ORDER — CEFAZOLIN SODIUM 1 G/50ML
1 SOLUTION INTRAVENOUS SEE ADMIN INSTRUCTIONS
Status: DISCONTINUED | OUTPATIENT
Start: 2019-08-09 | End: 2019-08-09 | Stop reason: HOSPADM

## 2019-08-09 RX ORDER — NALOXONE HYDROCHLORIDE 0.4 MG/ML
.1-.4 INJECTION, SOLUTION INTRAMUSCULAR; INTRAVENOUS; SUBCUTANEOUS
Status: DISCONTINUED | OUTPATIENT
Start: 2019-08-09 | End: 2019-08-09 | Stop reason: HOSPADM

## 2019-08-09 RX ORDER — CEFAZOLIN SODIUM 2 G/50ML
2 SOLUTION INTRAVENOUS
Status: COMPLETED | OUTPATIENT
Start: 2019-08-09 | End: 2019-08-09

## 2019-08-09 RX ORDER — AMOXICILLIN 250 MG
1-2 CAPSULE ORAL 2 TIMES DAILY
Qty: 30 TABLET | Refills: 0 | Status: SHIPPED | OUTPATIENT
Start: 2019-08-09

## 2019-08-09 RX ADMIN — OXYCODONE HYDROCHLORIDE 5 MG: 5 TABLET ORAL at 11:30

## 2019-08-09 RX ADMIN — PROPOFOL 50 MG: 10 INJECTION, EMULSION INTRAVENOUS at 10:43

## 2019-08-09 RX ADMIN — SODIUM CHLORIDE, SODIUM LACTATE, POTASSIUM CHLORIDE, CALCIUM CHLORIDE: 600; 310; 30; 20 INJECTION, SOLUTION INTRAVENOUS at 08:50

## 2019-08-09 RX ADMIN — LIDOCAINE HYDROCHLORIDE 100 MG: 20 INJECTION, SOLUTION INFILTRATION; PERINEURAL at 10:39

## 2019-08-09 RX ADMIN — ONDANSETRON 4 MG: 2 INJECTION INTRAMUSCULAR; INTRAVENOUS at 10:39

## 2019-08-09 RX ADMIN — BUPIVACAINE HYDROCHLORIDE 10 ML: 2.5 INJECTION, SOLUTION EPIDURAL; INFILTRATION; INTRACAUDAL at 09:31

## 2019-08-09 RX ADMIN — FENTANYL CITRATE 25 MCG: 50 INJECTION, SOLUTION INTRAMUSCULAR; INTRAVENOUS at 09:03

## 2019-08-09 RX ADMIN — GABAPENTIN 300 MG: 300 CAPSULE ORAL at 08:50

## 2019-08-09 RX ADMIN — PROPOFOL 250 MG: 10 INJECTION, EMULSION INTRAVENOUS at 10:39

## 2019-08-09 RX ADMIN — CEFAZOLIN SODIUM 2 G: 2 SOLUTION INTRAVENOUS at 10:39

## 2019-08-09 RX ADMIN — DEXAMETHASONE SODIUM PHOSPHATE 4 MG: 4 INJECTION, SOLUTION INTRA-ARTICULAR; INTRALESIONAL; INTRAMUSCULAR; INTRAVENOUS; SOFT TISSUE at 10:39

## 2019-08-09 RX ADMIN — ACETAMINOPHEN 975 MG: 325 TABLET ORAL at 08:50

## 2019-08-09 RX ADMIN — PROPOFOL 150 MCG/KG/MIN: 10 INJECTION, EMULSION INTRAVENOUS at 10:39

## 2019-08-09 ASSESSMENT — MIFFLIN-ST. JEOR: SCORE: 1310.45

## 2019-08-09 NOTE — ANESTHESIA POSTPROCEDURE EVALUATION
Anesthesia POST Procedure Evaluation    Patient: Agatha Fung   MRN:     [id number] Gender:   female   Age:    64 year old :      1955        Preoperative Diagnosis: Knee Arthrofibrosis   Procedure(s):  Left Knee Mini Open Lysis of Adhesions   Postop Comments: No value filed.       Anesthesia Type:  Not documented  General, Peripheral Nerve Block, For Post-op pain in coordination with surgeon    Reportable Event: NO     PAIN: Uncomplicated   Sign Out status: Comfortable, Well controlled pain     PONV: No PONV   Sign Out status:  No Nausea or Vomiting     Neuro/Psych: Uneventful perioperative course   Sign Out Status: Preoperative baseline; Age appropriate mentation     Airway/Resp.: Uneventful perioperative course   Sign Out Status: Non labored breathing, age appropriate RR; Resp. Status within EXPECTED Parameters     CV: Uneventful perioperative course   Sign Out status: Appropriate BP and perfusion indices; Appropriate HR/Rhythm     Disposition:   Sign Out in:  PACU  Disposition:  Phase II; Home  Recovery Course: Uneventful  Follow-Up: Not required           Last Anesthesia Record Vitals:  CRNA VITALS  2019 1052 - 2019 1152      2019             Resp Rate (set):  10          Last PACU Vitals:  Vitals Value Taken Time   BP 99/76 2019 11:31 AM   Temp 36.4  C (97.5  F) 2019 11:31 AM   Pulse 73 2019 11:30 AM   Resp 9 2019 11:35 AM   SpO2 96 % 2019 11:35 AM   Temp src     NIBP     Pulse     SpO2     Resp     Temp     Ht Rate     Temp 2     Vitals shown include unvalidated device data.      Electronically Signed By: Tony Carmen MD, MD, 2019, 12:02 PM

## 2019-08-09 NOTE — ANESTHESIA CARE TRANSFER NOTE
Patient: Agatha Fung    Procedure(s):  Left Knee Mini Open Lysis of Adhesions    Diagnosis: Knee Arthrofibrosis  Diagnosis Additional Information: No value filed.    Anesthesia Type:   General, Peripheral Nerve Block, For Post-op pain in coordination with surgeon     Note:  Airway :Room Air  Patient transferred to:PACU  Comments: Uneventful transport to PACU - room air  Report to RN - Jesus  Exchanging well; color natl/pink  Pt responds appropriately to command  Pt stated she is having mild discomfort  IV patent  Lips/teeth/dentition as preop status  Questions answered  /84  HR 68 nsr  TAT 97.5  RR 14  Sat [phone] Report: Identifed the Patient, Identified the Reponsible Provider, Reviewed the pertinent medical history, Discussed the surgical course, Reviewed Intra-OP anesthesia mangement and issues during anesthesia, Set expectations for post-procedure period and Allowed opportunity for questions and acknowledgement of understanding      Vitals: (Last set prior to Anesthesia Care Transfer)    CRNA VITALS  8/9/2019 1052 - 8/9/2019 1127      8/9/2019             Resp Rate (set):  10                Electronically Signed By: MAEGAN ESPINAL CRNA  August 9, 2019  11:27 AM

## 2019-08-09 NOTE — BRIEF OP NOTE
.Orthopaedic Surgery Brief Op-Note     Patient: Agatha Fung  : 1955  Date of Service: 2019 6:41 AM    Preoperative Diagnosis: Knee Arthrofibrosis     Postoperative Diagnosis: same    Procedure: Procedure(s):  Left Knee Mini Open Lysis of Adhesions    Surgeon: Dr. Richter    Assistants:  Adolfo Lopez MD    Anesthesia: General     Estimated Blood Loss: 15 cc    Tourniquet Time: 0 minutes     Specimen: none       Complications: none    Condition: stable    Findings: please see dictated operative note    Plan:    WBAT with assistive devices as needed    CPM not indicated    Lysis of adhesions prednisone taper:  POD #0-10: Take four 5mg tablets daily (20mg/day x 10 days)   POD #11-20: Take two 5mg tablets daily (10mg/day x 10 days)   POD #21-30: Take one 5mg tablet daily (5mg/day x 10 days)     PT as outpatient; an order for PT will be provided to the patient at time of discharge    ADAT    Pain control with orals prn    Bowel prophylaxis with senna-docusate    DVT prophylaxis: ASA x4 weeks and mechanical     Future Appointments   Date Time Provider Department Center   2019 12:30 PM Nurse, [facility]   9/10/2019  9:45 AM Pamela Richter MD [facility]       Disposition: patient may be discharged with  once appropriate discharge criteria have been met    I assisted with positioning, prepping and draping, and closure.    Adolfo Lopez MD PGY2  Orthopaedic Surgery    Please page me at [phone] with any questions/concerns. If there is no response, if it is a weekend, or if it is during evening hours, please page the orthopaedic surgery resident on call.

## 2019-08-09 NOTE — DISCHARGE INSTRUCTIONS
"[facility]  Home Care Following Anesthesia  For 24 hours after surgery:  1. Get plenty of rest.  A responsible adult must stay with you for at least 24 hours after you leave the surgery center.  2. Do not drive or use heavy equipment.  If you have weakness or tingling, don't drive or use heavy equipment until this feeling goes away.   3. Do not drink alcohol.   4. Avoid strenuous or risky activities.  Ask for help when climbing stairs.  5. You may feel lightheaded.  IF so, sit for a few minutes before standing.  Have someone help you get up.   6. If you have nausea (feel sick to your stomach): Drink only clear liquids such as apple juice, ginger ale, broth or 7-Up.  Rest may also help.  Be sure to drink enough fluids.  Move to a regular diet as you feel able.   7. You may have a slight fever.  Call the doctor if your fever is over 100 F (37.7 C) (taken under the tongue) or lasts longer than 24 hours.  8. You may have a dry mouth, a sore throat, muscle aches or trouble sleeping. These should go away after 24 hours.  9. Do not make important or legal decisions.        Today you received an Exparel block to numb the nerves near your surgery site.  This is a block using local anesthetic or \"numbing\" medication injected around the nerves to anesthetize or \"numb\" the area supplied by those nerves.  This block is injected into the muscle layer near your surgical site.  This medication may numb the location where you had surgery up to 72 hours.  If your surgical site is an arm or leg you should be careful with your affected limb, since it is possible to injure your limb without being aware of it due to the numbing.  Until full feeling returns, you should guard against bumping or hitting your limb, and avoid extreme hot or cold temperatures on the skin.  As the block wears off, the feeling will return as a tingling or prickly sensation near your surgical site.  You will experince " more discomfort from your incision as the feeling returns.  You may want to take a pain pill (a narcotic or Tylenol if this was prescribed by your surgeon) when you start to experience mild pain before the pain beomes more severe.  If your pain medications do not control your pain, you should notify your surgeon.    Tips for taking pain medications  To get the best pain relief possible, remember these points:    Take pain medications as directed, before pain becomes severe.    Pain medication can upset your stomach: taking it with food may help.    Constipation is a common side effect of pain medication. Drink plenty of  fluids.    Eat foods high in fiber. Take a stool softener if recommended by your doctor or pharmacist.    Do not drink alcohol, drive or operate machinery while taking pain medications.    Ask about other ways to control pain, such as with heat, ice or relaxation.    Tylenol/Acetaminophen Consumption  To help encourage the safe use of acetaminophen, the makers of TYLENOL  have lowered the maximum daily dose for single-ingredient Extra Strength TYLENOL  (acetaminophen) products sold in the [place] from 8 pills per day (4,000 mg) to 6 pills per day (3,000 mg). The dosing interval has also changed from 2 pills every 4-6 hours to 2 pills every 6 hours.    If you feel your pain relief is insufficient, you may take Tylenol/Acetaminophen in addition to your narcotic pain medication.     Be careful not to exceed 3,000 mg of Tylenol/Acetaminophen in a 24 hour period from all sources.    If you are taking extra strength Tylenol/acetaminophen (500 mg), the maximum dose is 6 tablets in 24 hours.    If you are taking regular strength acetaminophen (325 mg), the maximum dose is 9 tablets in 24 hours.    **975 mg Tylenol given at 8:50, can take again at 2:50 PM    Call a doctor for any of the followin. Signs of infection (fever, growing tenderness at the surgery site, a large amount of drainage or bleeding,  "severe pain, foul-smelling drainage, redness, swelling).  2. It has been over 8 to 10 hours since surgery and you are still not able to urinate (pass water).  3. Headache for over 24 hours.  Your doctor is:  Dr. Pamela Richter, Orthopaedics: [phone]                 Or dial [phone] and ask for the resident on call for:  Orthopaedics  For emergency care, call the:  [facility] Emergency Department: [phone] (TTY for hearing impaired: [phone])  Information about liposomal bupivacaine (Exparel)    What is Liposomal Bupivacaine?    Liposomal Bupivacaine is a numbing medication that can help you manage your pain after surgery.  This medication is similar to \"novacaine,\" which is often used by the dentist.  Liposomal bupivacaine is released slowly and can help control pain for up to 72 hours.    What is the purpose of Liposomal Bupivacaine?    To manage your pain after surgery    To help you sleep better, take deep breaths, walk more comfortable, and feel up to visiting with others    How is the procedure done?    Liposomal bupivacaine is a medication given by an injection.    It is usually given right before your surgery.  If this is the case, you will be awake or sedated, but you should experience minimal pain during the procedure.    For some people, the injection may be given at the very end of your surgery.  It all depends on the type of surgery and your situation.    The procedure usually takes about 5-15 minutes.  An ultrasound machine will help the anesthesiologist insert it in the right place or the surgeon will inject it under direct vision.     A needle is used to place the numbing medication under your skin.  It provides pain relief by numbing the tissue in the area where your surgeon will make the incision.    What can I expect?    You may experience numbness, tingling, or a feeling of heaviness around the area that was injected.    If you experience any of the follow symptoms IMMEDIATELY " CALL THE REGIONAL ANESTHESIA PAIN SERVICE:    Numbness or tingling occurs in areas other than around the injection site    Blurry vision    Ringing in your ears    A metallic taste in your mouth    PAGE: Dial [phone].  When prompted, enter the following 4-digit ID number:  [id number].  You will be prompted to enter your phone number; and then enter the # sign.  The clinician on call will call you back.    OR    CALL: Dial [phone].  Let the hospital  know that you are having a problem with a nerve block and that you would like to speak to the regional anesthesia pain service right away.    You should not receive any other type of numbing medication within 4 days after receiving liposomal bupivacaine unless your anesthesiologist approves.    Post Operative Instructions: Regional Anesthetic for Lower Extremity with Liposomal Bupivacaine  General Information:   Regional anesthesia is when local anesthetic or  numbing  medication is injected around the nerves to anesthetize or  numb  the area supplied by that set of nerves. It is a type of analgesia used to control pain and decreases the need for narcotics following surgery.    Types of Regional Blocks:  Femoral: A block injected into the groin area of the operative leg of a patient having thigh or knee surgery.  Adductor Canal: A block injected into the mid thigh of the operative leg of a patient having knee or ankle surgery.  Popliteal or Distal Sciatic: A block injected into the back of the knee of the operative leg of patients having foot or ankle surgery.   Ankle:  An anesthetic medication is injected into the ankle of the operative leg of a patient having foot or toe surgery.     Procedure:   The type of anesthesia your doctor used to numb your leg will usually not wear off for 24-48 hours, but may last as long as 72 hours. You should be careful during that period, since it is possible to injure your leg without being aware of the injury. While your leg  "is numb you should:    Use crutches (minimal weight bearing until your motor and strength is completely back to normal)    Avoid striking or bumping your leg    Avoid extreme hot or cold    Discomfort:  You will have a tingling and prickly sensation in your leg as the feeling begins to return; you can also expect some discomfort. The amount of discomfort is unpredictable, but if you have more pain than can be controlled with pain medication, you should notify your physician.     Pain Medicine:   Begin taking your oral pain pills before bedtime and during the night to avoid a sudden onset of pain as part of the block wears off. Do not engage in drinking, driving, or hazardous occupations while taking pain medication. Safety Tips for Using Crutches    Crutch Fit:    Assume good standing posture with shoulders relaxed and crutch tips 6-8 inches out from the side of the foot.    The underarm pad should fall 2-3 fingers width below the armpit.    The handgrip is positioned level with the wrist to allow 30  flexion at the elbow.    Safety Tips:    Bear weight on your hands, not on your armpits.    Do not add extra padding to the underarm pad. This will, in effect, lengthen the crutches and increase risk of nerve injury.    Wear flat, properly fitting shoes. Do not walk in stocking feet, high heels or slippers.    Household hazards:  --Throw rugs should be removed from floors.  --Stairs should be cleared of obstacles.  --Use extra caution on slippery, highly polished, littered or uneven floor surfaces.  --Check for electric cords.    Check crutch tips for excessive wear and keep wing nuts tight.    While walking, look forward with  head up  and  eyes open.  Take equal length steps.    Use BOTH crutches.    Stairs Sequence:    UP: \"Good\" leg first, followed by  bad  leg, then crutches.    DOWN: Crutches, followed by  bad  leg, \"good\" leg.     Walking with Crutches:    Move both crutches forward at the same " time.    Non-Weight Bearing (NWB):  Hold the involved leg up and swing through the crutches with the involved leg. The involved leg does not touch the floor.    Toe Touch Weight Bearing (TTWB): Move the involved leg forward. Rest it lightly on the floor for balance only. Step through the crutches with the uninvolved leg.    Partial Weight Bearing (PWB): Move the involved leg forward. Step down the weight of the leg only.  Step through the crutches with the uninvolved leg.    Weight Bearing As Tolerated (WBAT): Move the involved leg forward. Put as much pressure through the involved leg as you can tolerate comfortably. Then step through the crutches with the uninvolved leg.

## 2019-08-09 NOTE — ANESTHESIA PROCEDURE NOTES
Peripheral Nerve Block Procedure Note    Staff:     Anesthesiologist:  Tony Carmen MD    Referred By:  Pamela Richter MD  Location: Pre-op  Procedure Start/Stop TImes:      8/9/2019 9:28 AM     8/9/2019 9:36 AM    patient identified, IV checked, site marked, risks and benefits discussed, informed consent, monitors and equipment checked, pre-op evaluation, at physician/surgeon's request and post-op pain management      Correct Patient: Yes      Correct Position: Yes      Correct Site: Yes      Correct Procedure: Yes      Correct Laterality:  Yes    Site Marked:  Yes  Procedure details:     Procedure:  Adductor canal    ASA:  1    Laterality:  Left    Position:  Supine    Sterile Prep: chloraprep, mask and sterile gloves      Needle:  Insulated and short bevel    Needle gauge:  21    Needle length (inches):  4    Ultrasound: Yes      Ultrasound used to identify targeted nerve, plexus, or vascular structure and placed a needle adjacent to it      Permanent Image entered into patiient's record      Abnormal pain on injection: No      Blood Aspirated: No      Paresthesias:  No    Bleeding at site: No      Bolus via:  Needle    Infusion Method:  Single Shot    Complications:  None  Assessment/Narrative:      133mg exparel given adductor canal

## 2019-08-09 NOTE — ANESTHESIA PREPROCEDURE EVALUATION
Anesthesia Pre-Procedure Evaluation    Patient: Agatha Fung   MRN:     [id number] Gender:   female   Age:    64 year old :      1955        Preoperative Diagnosis: Knee Arthrofibrosis   Procedure(s):  Left Knee Mini Open Lysis of Adhesions     No past medical history on file.   Past Surgical History:   Procedure Laterality Date     ARTHROPLASTY KNEE Left 3/29/2018    Procedure: ARTHROPLASTY KNEE;  Left Total Knee Arthroplasty; Left knee Hardware(Boothe staple) removal and Left lateral retinacular lengthening;  Surgeon: Pamela Richter MD;  Location: [facility] OR     ORTHOPEDIC SURGERY Left     ACL reconstruction     REMOVE HARDWARE KNEE Left 3/29/2018    Procedure: REMOVE HARDWARE KNEE;;  Surgeon: Pamela Richter MD;  Location: [facility] OR          Anesthesia Evaluation     . Pt has had prior anesthetic. Type: General    No history of anesthetic complications          ROS/MED HX    ENT/Pulmonary:  - neg pulmonary ROS     Neurologic:  - neg neurologic ROS     Cardiovascular:  - neg cardiovascular ROS       METS/Exercise Tolerance:  4 - Raking leaves, gardening   Hematologic:  - neg hematologic  ROS       Musculoskeletal:  - neg musculoskeletal ROS       GI/Hepatic:  - neg GI/hepatic ROS       Renal/Genitourinary:  - ROS Renal section negative       Endo:  - neg endo ROS       Psychiatric:  - neg psychiatric ROS       Infectious Disease:  - neg infectious disease ROS       Malignancy:         Other:                         PHYSICAL EXAM:   Mental Status/Neuro: A/A/O   Airway: Facies: Feasible  Mallampati: I  Mouth/Opening: Full  TM distance: > 6 cm  Neck ROM: Full   Respiratory: Auscultation: CTAB     Resp. Rate: Normal     Resp. Effort: Normal      CV: Rhythm: Regular  Rate: Age appropriate  Heart: Normal Sounds  Edema: None   Comments:      Dental: Normal Dentition                LABS:  CBC:   Lab Results   Component Value Date    HGB 11.6 (L) 2018    HGB 14.0 2018     BMP:   Lab  "Results   Component Value Date     (H) 03/30/2018    GLC 99 03/29/2018     COAGS:   Lab Results   Component Value Date    INR 1.21 (H) 03/30/2018     POC:   Lab Results   Component Value Date    HCG Negative 03/29/2018     OTHER: No results found for: PH, LACT, A1C, FUAD, PHOS, MAG, ALBUMIN, PROTTOTAL, ALT, AST, GGT, ALKPHOS, BILITOTAL, BILIDIRECT, LIPASE, AMYLASE, UMAIR, TSH, T4, T3, CRP, SED     Preop Vitals    BP Readings from Last 3 Encounters:   08/09/19 103/73   03/30/18 119/65    Pulse Readings from Last 3 Encounters:   08/09/19 69   03/29/18 66      Resp Readings from Last 3 Encounters:   08/09/19 16   03/30/18 16    SpO2 Readings from Last 3 Encounters:   08/09/19 96%   03/30/18 99%      Temp Readings from Last 1 Encounters:   08/09/19 36.4  C (97.5  F) (Oral)    Ht Readings from Last 1 Encounters:   08/09/19 1.689 m (5' 6.5\")      Wt Readings from Last 1 Encounters:   08/09/19 72.6 kg (160 lb)    Estimated body mass index is 25.44 kg/m  as calculated from the following:    Height as of this encounter: 1.689 m (5' 6.5\").    Weight as of this encounter: 72.6 kg (160 lb).     LDA:  Peripheral IV 03/29/18 Left Hand (Active)   Number of days: 498       Peripheral IV 08/09/19 Right Hand (Active)   Site Assessment WDL 8/9/2019  8:59 AM   Line Status Infusing 8/9/2019  8:59 AM   Phlebitis Scale 0-->no symptoms 8/9/2019  8:59 AM   Infiltration Scale 0 8/9/2019  8:59 AM   Dressing Intervention New dressing  8/9/2019  8:59 AM   Number of days: 0       Airway - Adult/Peds laryngeal mask airway (Active)   Number of days: 0       Closed/Suction Drain 1 Left Knee Accordion 10 [place] (Active)   Number of days: 498        Assessment:   ASA SCORE: 1    H&P: History and physical reviewed and following examination; no interval change.   Smoking Status:  Non-Smoker/Unknown   NPO Status: NPO Appropriate     Plan:   Anes. Type:  General; Peripheral Nerve Block; For Post-op pain in coordination with surgeon     Block " Details: Single Shot; Exparel; Adductor C.   Pre-Medication: None   Induction:  IV (Standard)   Airway: LMA   Access/Monitoring: PIV   Maintenance: TIVA     Postop Plan:   Postop Pain: Opioids  Postop Sedation/Airway: Not planned  Disposition: Outpatient     PONV Management:   Adult Risk Factors: Female, Non-Smoker, Postop Opioids   Prevention: Ondansetron, Dexamethasone, No Volatiles     CONSENT: Direct conversation   Plan and risks discussed with: Patient          Comments for Plan/Consent:  Discussed with Patient Off-Label use of Liposomal Bupivacaine (Exparel) for Nerve Block.    Relevant risks & benefits were discussed with patient.    All questions were answered and there was agreement to proceed.    Patient signed Off-Label Use of Exparel Consent Form.                     Tony Carmen MD, MD

## 2019-08-09 NOTE — OR NURSING
Patient received left side Adductor nerve block  with Exparel.  Fentanyl 25mcg and Versed 0.5mg given. Tolerated procedure well.

## 2019-08-11 NOTE — OP NOTE
Procedure Date: 08/09/2019      PREOPERATIVE DIAGNOSES:   1.  Left knee mild arthrofibrosis.   2.  Status post previous left total knee replacement (03/2018).      POSTOPERATIVE DIAGNOSES:     1.  Left knee mild arthrofibrosis.   2.  Status post previous left total knee replacement (03/2018).      OPERATION:  Left knee mini open lysis of adhesions.      OPERATORS:  Pamela Richter MD, and Adolfo Lopez MD       ANESTHESIA:  General.      Preoperative range of motion in clinic 0-100.  Under anesthesia 0-105.      Postop maximum passive range of motion 0-126.  Gravity only 0-118.      PROCEDURE:  Under general anesthesia, the patient was positioned supine on the operating table.  The patient's leg was prepped and draped in the usual sterile fashion.  A pause was performed identifying the correct leg and the administration of antibiotics.      We also gave the patient 1 dose of tranexamic acid and 100 mg of Solu-Cortef.        Under general anesthesia, the patient was positioned supine on the operating table.  The patient's leg was prepped and draped in the usual sterile fashion.  A pause was performed identifying the correct leg and the administration of antibiotics, TXA and IV steroids.      We made 2 incisions approximately 1 inch long.  They were on the superomedial and superolateral aspects of the knee, approximately 2 handsbreadth above the superior patellar border.  Through both of these entries, we used a large-bore cannula and entered into the suprapatellar pouch.  Using this large-bore cannula, we lysed the adhesions blindly.  This was along the suprapatellar pouch and down the medial and lateral gutters.      We then did a manual manipulation.  Passive range of motion as stated above.      Following this, we ran a liter of antibiotic irrigation through the knee.      These 2 incisions were closed with simple sutures of 2-0 Vicryl followed by figure-of-eight sutures of nylon.  Betadine, Adaptic, a sterile  dressing and Tubigrip stockinette were put in place.  The patient tolerated the procedure well and was taken to the recovery room in satisfactory condition.      Postoperatively, she will be placed on oral steroids for 30 days starting with 20 mg x10 days and ending with 5 mg x10 days.         ANTHONY RAMON MD             D: 2019   T: 2019   MT: tiesha      Name:     ANABELLE VELAZCO   MRN:      [id number]        Account:        [id number]   :      1955           Procedure Date: 2019      Document: [id number]

## 2019-08-13 ENCOUNTER — THERAPY VISIT (OUTPATIENT)
Dept: PHYSICAL THERAPY | Facility: CLINIC | Age: 64
End: 2019-08-13
Payer: COMMERCIAL

## 2019-08-13 DIAGNOSIS — M25.562 ACUTE PAIN OF LEFT KNEE: ICD-10-CM

## 2019-08-13 DIAGNOSIS — R60.0 LOCALIZED EDEMA: ICD-10-CM

## 2019-08-13 PROCEDURE — 97110 THERAPEUTIC EXERCISES: CPT | Mod: GP | Performed by: PHYSICAL THERAPIST

## 2019-08-13 PROCEDURE — 97161 PT EVAL LOW COMPLEX 20 MIN: CPT | Mod: GP | Performed by: PHYSICAL THERAPIST

## 2019-08-13 PROCEDURE — 97016 VASOPNEUMATIC DEVICE THERAPY: CPT | Mod: GP | Performed by: PHYSICAL THERAPIST

## 2019-08-13 ASSESSMENT — ACTIVITIES OF DAILY LIVING (ADL)
LIMPING: I DO NOT HAVE THE SYMPTOM
STAND: ACTIVITY IS NOT DIFFICULT
WALK: ACTIVITY IS NOT DIFFICULT
GO DOWN STAIRS: ACTIVITY IS MINIMALLY DIFFICULT
KNEE_ACTIVITY_OF_DAILY_LIVING_SCORE: 62.86
RAW_SCORE: 44
KNEE_ACTIVITY_OF_DAILY_LIVING_SUM: 44
RISE FROM A CHAIR: ACTIVITY IS SOMEWHAT DIFFICULT
GO UP STAIRS: ACTIVITY IS MINIMALLY DIFFICULT
STIFFNESS: THE SYMPTOM AFFECTS MY ACTIVITY MODERATELY
GIVING WAY, BUCKLING OR SHIFTING OF KNEE: I HAVE THE SYMPTOM BUT IT DOES NOT AFFECT MY ACTIVITY
AS_A_RESULT_OF_YOUR_KNEE_INJURY,_HOW_WOULD_YOU_RATE_YOUR_CURRENT_LEVEL_OF_DAILY_ACTIVITY?: ABNORMAL
SIT WITH YOUR KNEE BENT: ACTIVITY IS FAIRLY DIFFICULT
KNEEL ON THE FRONT OF YOUR KNEE: ACTIVITY IS VERY DIFFICULT
SQUAT: ACTIVITY IS VERY DIFFICULT
HOW_WOULD_YOU_RATE_THE_CURRENT_FUNCTION_OF_YOUR_KNEE_DURING_YOUR_USUAL_DAILY_ACTIVITIES_ON_A_SCALE_FROM_0_TO_100_WITH_100_BEING_YOUR_LEVEL_OF_KNEE_FUNCTION_PRIOR_TO_YOUR_INJURY_AND_0_BEING_THE_INABILITY_TO_PERFORM_ANY_OF_YOUR_USUAL_DAILY_ACTIVITIES?: 30
WEAKNESS: I HAVE THE SYMPTOM BUT IT DOES NOT AFFECT MY ACTIVITY
HOW_WOULD_YOU_RATE_THE_OVERALL_FUNCTION_OF_YOUR_KNEE_DURING_YOUR_USUAL_DAILY_ACTIVITIES?: ABNORMAL
SWELLING: THE SYMPTOM AFFECTS MY ACTIVITY MODERATELY
PAIN: THE SYMPTOM AFFECTS MY ACTIVITY MODERATELY

## 2019-08-13 NOTE — PROGRESS NOTES
[facility] Initial Evaluation  Subjective:  The history is provided by the patient. No  was used.   Agatha Fung being seen for S/p L knee manip and scope for mini lysis of adhesions.   Problem began 8/9/2019. Where condition occurred: other.Problem occurred: patient had TKA 3/29/18 and didn't get full motion back, so had manip on 8/9/19  and reported as 5/10 on pain scale. General health as reported by patient is excellent. Pertinent medical history includes:  Menopausal and osteoarthritis.    Surgeries include:  Orthopedic surgery (L TKA'18).  Current medications:  Hormone replacement therapy and steroids. Other medications details: Tylenol.   Primary job tasks include:  Prolonged sitting and computer work (and 60% travel).  Pain is described as aching and sharp and is intermittent. Pain is worse during the day. Since onset symptoms are gradually improving.      Patient is . Restrictions include:  Working in normal job with restrictions.    Barriers include:  Stairs.  Red flags:  None as reported by patient.  Type of problem:  Left knee   Condition occurred with:  Degenerative joint disease. This is a new condition    Patient reports pain:  Anterior.  Associated symptoms:  Loss of motion/stiffness, loss of strength and edema. Symptoms are exacerbated by bending/squatting, kneeling, descending stairs and ascending stairs and relieved by ice and analgesics.                      Objective:    Gait:    Gait Type:  Normal   Assistive Devices:  None      Flexibility/Screens:       Lower Extremity:  Decreased left lower extremity flexibility:Quadriceps and Hamstrings                                                        Knee Evaluation:  ROM:    AROM    Hyperextension:  Left:  5    Right: 4  Extension:  Left: 0    Right:  0  Flexion: Left: 104    Right: 130  PROM    Hyperextension: Left: 5   Right:   Extension: Left: 0   Right:   Flexion: Left: 119   Right:        Strength:       Flexion:  Left: 4+/5   Pain:      Right: 5/5   Pain:    Quad Set Left: Good    Pain:   Quad Set Right: Good    Pain:        Edema:    Circumference:  20 cm Prox:  Left:  41cm  Right:  40cm    Joint Line:  Left:  39.5cm   Right:  36.25cm    Mobility Testing:      Patellofemoral Medial:  Left: normal      Patellofemoral Lateral:  Left: normal      Patellofemoral Superior:  Left: normal      Patellofemoral Inferior:  Left: normal            General     ROS    Assessment/Plan:    Patient is a 64 year old female with left side knee complaints.    Patient has the following significant findings with corresponding treatment plan.                Diagnosis 1:  S/p L knee manip and scope for mini lysis of adhesions  Pain -  manual therapy, self management, education and home program  Decreased ROM/flexibility - manual therapy, therapeutic exercise and home program  Decreased joint mobility - manual therapy, therapeutic exercise and home program  Edema - self management/home program  Decreased function - therapeutic activities and home program    Therapy Evaluation Codes:   1) History comprised of:   Personal factors that impact the plan of care:      None.    Comorbidity factors that impact the plan of care are:      None.     Medications impacting care: None.  2) Examination of Body Systems comprised of:   Body structures and functions that impact the plan of care:      Knee.   Activity limitations that impact the plan of care are:      Bending, Squatting/kneeling and Stairs.  3) Clinical presentation characteristics are:   Evolving/Changing.  4) Decision-Making    Low complexity using standardized patient assessment instrument and/or measureable assessment of functional outcome.  Cumulative Therapy Evaluation is: Low complexity.    Previous and current functional limitations:  (See Goal Flow Sheet for this information)    Short term and Long term goals: (See Goal Flow Sheet for this information)      Communication ability:  Patient appears to be able to clearly communicate and understand verbal and written communication and follow directions correctly.  Treatment Explanation - The following has been discussed with the patient:   RX ordered/plan of care  Anticipated outcomes  Possible risks and side effects  This patient would benefit from PT intervention to resume normal activities.   Rehab potential is excellent.    Frequency:  2 X week, once daily  Duration:  for 6-12 visits  Discharge Plan:  Achieve all LTG.  Independent in home treatment program.  Reach maximal therapeutic benefit.    Please refer to the daily flowsheet for treatment today, total treatment time and time spent performing 1:1 timed codes.

## 2019-08-15 ENCOUNTER — THERAPY VISIT (OUTPATIENT)
Dept: PHYSICAL THERAPY | Facility: CLINIC | Age: 64
End: 2019-08-15
Payer: COMMERCIAL

## 2019-08-15 DIAGNOSIS — M25.562 ACUTE PAIN OF LEFT KNEE: ICD-10-CM

## 2019-08-15 DIAGNOSIS — M25.669 KNEE STIFFNESS, UNSPECIFIED LATERALITY: ICD-10-CM

## 2019-08-15 DIAGNOSIS — R60.0 LOCALIZED EDEMA: ICD-10-CM

## 2019-08-15 PROCEDURE — 97016 VASOPNEUMATIC DEVICE THERAPY: CPT | Mod: GP | Performed by: PHYSICAL THERAPIST

## 2019-08-15 PROCEDURE — 97110 THERAPEUTIC EXERCISES: CPT | Mod: GP | Performed by: PHYSICAL THERAPIST

## 2019-08-19 ENCOUNTER — THERAPY VISIT (OUTPATIENT)
Dept: PHYSICAL THERAPY | Facility: CLINIC | Age: 64
End: 2019-08-19
Payer: COMMERCIAL

## 2019-08-19 DIAGNOSIS — R60.0 LOCALIZED EDEMA: ICD-10-CM

## 2019-08-19 DIAGNOSIS — M25.562 ACUTE PAIN OF LEFT KNEE: ICD-10-CM

## 2019-08-19 PROCEDURE — 97110 THERAPEUTIC EXERCISES: CPT | Mod: GP | Performed by: PHYSICAL THERAPIST

## 2019-08-19 PROCEDURE — 97016 VASOPNEUMATIC DEVICE THERAPY: CPT | Mod: GP | Performed by: PHYSICAL THERAPIST

## 2019-08-23 ENCOUNTER — OFFICE VISIT (OUTPATIENT)
Dept: ORTHOPEDICS | Facility: CLINIC | Age: 64
End: 2019-08-23
Payer: COMMERCIAL

## 2019-08-23 DIAGNOSIS — Z98.890 S/P KNEE SURGERY: Primary | ICD-10-CM

## 2019-08-23 NOTE — PROGRESS NOTES
Patient was seen today for a nurse visit. Patient was Dr. Richter's patient here for suture removal on the left knee  Patient uses a compression sleeve. Patient's left knee has noticeable bruise on the medial side and swelling medially and laterally. Patient stated to RN that he has been icing twice a day and has been doing physical therapy. RN used suture removal kit and removed 4 sutures using clean technique. Patient tolerated procedure and verbalized she will return to see Dr. Richter as scheduled.    Scotty Donahue RN

## 2019-08-24 ENCOUNTER — THERAPY VISIT (OUTPATIENT)
Dept: PHYSICAL THERAPY | Facility: CLINIC | Age: 64
End: 2019-08-24
Payer: COMMERCIAL

## 2019-08-24 DIAGNOSIS — M25.562 ACUTE PAIN OF LEFT KNEE: ICD-10-CM

## 2019-08-24 DIAGNOSIS — R60.0 LOCALIZED EDEMA: ICD-10-CM

## 2019-08-24 PROCEDURE — 97140 MANUAL THERAPY 1/> REGIONS: CPT | Mod: GP | Performed by: PHYSICAL THERAPIST

## 2019-08-24 PROCEDURE — 97016 VASOPNEUMATIC DEVICE THERAPY: CPT | Mod: GP | Performed by: PHYSICAL THERAPIST

## 2019-08-24 PROCEDURE — 97110 THERAPEUTIC EXERCISES: CPT | Mod: GP | Performed by: PHYSICAL THERAPIST

## 2019-08-27 ENCOUNTER — THERAPY VISIT (OUTPATIENT)
Dept: PHYSICAL THERAPY | Facility: CLINIC | Age: 64
End: 2019-08-27
Payer: COMMERCIAL

## 2019-08-27 DIAGNOSIS — M25.562 ACUTE PAIN OF LEFT KNEE: ICD-10-CM

## 2019-08-27 DIAGNOSIS — R60.0 LOCALIZED EDEMA: ICD-10-CM

## 2019-08-27 PROCEDURE — 97140 MANUAL THERAPY 1/> REGIONS: CPT | Mod: GP | Performed by: PHYSICAL THERAPIST

## 2019-08-27 PROCEDURE — 97110 THERAPEUTIC EXERCISES: CPT | Mod: GP | Performed by: PHYSICAL THERAPIST

## 2019-09-03 ENCOUNTER — THERAPY VISIT (OUTPATIENT)
Dept: PHYSICAL THERAPY | Facility: CLINIC | Age: 64
End: 2019-09-03
Payer: COMMERCIAL

## 2019-09-03 DIAGNOSIS — M25.562 ACUTE PAIN OF LEFT KNEE: ICD-10-CM

## 2019-09-03 DIAGNOSIS — R60.0 LOCALIZED EDEMA: ICD-10-CM

## 2019-09-03 PROCEDURE — 97110 THERAPEUTIC EXERCISES: CPT | Mod: GP | Performed by: PHYSICAL THERAPIST

## 2019-09-03 PROCEDURE — 97140 MANUAL THERAPY 1/> REGIONS: CPT | Mod: GP | Performed by: PHYSICAL THERAPIST

## 2019-09-10 ENCOUNTER — OFFICE VISIT (OUTPATIENT)
Dept: ORTHOPEDICS | Facility: CLINIC | Age: 64
End: 2019-09-10
Payer: COMMERCIAL

## 2019-09-10 DIAGNOSIS — Z98.890 S/P KNEE SURGERY: Primary | ICD-10-CM

## 2019-09-10 NOTE — PROGRESS NOTES
Patient is a 64-year-old female who is approximately 1 month status post mini open lysis of adhesions left knee.  She is approximately 17 months status post her original left total knee replacement.    DOS 8/9/19 mini open lysis of adhesions, Left TKA 3/28/18      She is doing well in regards to that knee.  She is happy to report that she is biking and has been able to bike up to 6 miles.  Her typical daily routine is involves walking biking and the elliptical .  She has an outdoor bike but not an indoor bike but does have an indoor elliptical     She is going to physical therapy in [facility].  Her best effort to date has been 128 degrees.    Preoperative range of motion was 0-100 in the clinic 0-1 05 under anesthesia with postop range of motion to 126 degrees.    Physical exam today reveals mild fullness of the knee.  Incisions benign.  Good straight straight leg raising effort without a lag no notable atrophy.  Range of motion 0-1 26.    Assessment excellent postoperative results given the time from her surgery.  Plan I discussed with her the best ways that we can keep her knee functioning in regards to her motion.  I believe that biking would be great and she should do this to the 2 best that she can while the weather holds out.    She will follow-up with me on a as needed basis.  If she should feel that she is losing motion she will follow-up with me sooner.   This is a postop visit    Pamela Richter MD  Professor Orthopedic Surgery  [facility]

## 2019-09-10 NOTE — NURSING NOTE
Reason For Visit:   Chief Complaint   Patient presents with     RECHECK     DOS 8/9/19 mini open lysis of adhesions, Left TKA 3/28/18       Primary MD: Agatha Trinaa    ?  No  Occupation: .  Currently working? Yes.  Work status?  Full time.  Date of surgery: 3/29/18, 8/9/19  Type of surgery: Left TKA, mini open lysis of adhesions  Smoker: No  Request smoking cessation information: No    There were no vitals taken for this visit.    Pain Assessment  Patient Currently in Pain: Yes  0-10 Pain Scale: 2  Primary Pain Location: Knee(Left)             Michelle Bowman, ATC

## 2019-09-10 NOTE — LETTER
9/10/2019       RE: Agatha Fung  [address]  [place] [address]     Dear Colleague,    Thank you for referring your patient, Agatha Fung, to the [facility] at [facility]. Please see a copy of my visit note below.    Patient is a 64-year-old female who is approximately 1 month status post mini open lysis of adhesions left knee.  She is approximately 17 months status post her original left total knee replacement.    DOS 8/9/19 mini open lysis of adhesions, Left TKA 3/28/18    She is doing well in regards to that knee.  She is happy to report that she is biking and has been able to bike up to 6 miles.  Her typical daily routine is involves walking biking and the elliptical .  She has an outdoor bike but not an indoor bike but does have an indoor elliptical     She is going to physical therapy in [facility].  Her best effort to date has been 128 degrees.    Preoperative range of motion was 0-100 in the clinic 0-1 05 under anesthesia with postop range of motion to 126 degrees.    Physical exam today reveals mild fullness of the knee.  Incisions benign.  Good straight straight leg raising effort without a lag no notable atrophy.  Range of motion 0-1 26.    Assessment excellent postoperative results given the time from her surgery.  Plan I discussed with her the best ways that we can keep her knee functioning in regards to her motion.  I believe that biking would be great and she should do this to the 2 best that she can while the weather holds out.    She will follow-up with me on a as needed basis.  If she should feel that she is losing motion she will follow-up with me sooner.   This is a postop visit    Pamela Richter MD  Professor Orthopedic Surgery  [facility]

## 2019-09-11 ENCOUNTER — THERAPY VISIT (OUTPATIENT)
Dept: PHYSICAL THERAPY | Facility: CLINIC | Age: 64
End: 2019-09-11
Payer: COMMERCIAL

## 2019-09-11 DIAGNOSIS — M25.562 ACUTE PAIN OF LEFT KNEE: ICD-10-CM

## 2019-09-11 DIAGNOSIS — R60.0 LOCALIZED EDEMA: ICD-10-CM

## 2019-09-11 PROCEDURE — 97110 THERAPEUTIC EXERCISES: CPT | Mod: GP | Performed by: PHYSICAL THERAPIST

## 2019-09-11 PROCEDURE — 97140 MANUAL THERAPY 1/> REGIONS: CPT | Mod: GP | Performed by: PHYSICAL THERAPIST

## 2019-09-14 ENCOUNTER — THERAPY VISIT (OUTPATIENT)
Dept: PHYSICAL THERAPY | Facility: CLINIC | Age: 64
End: 2019-09-14
Payer: COMMERCIAL

## 2019-09-14 DIAGNOSIS — R60.0 LOCALIZED EDEMA: ICD-10-CM

## 2019-09-14 DIAGNOSIS — M25.562 ACUTE PAIN OF LEFT KNEE: ICD-10-CM

## 2019-09-14 PROCEDURE — 97110 THERAPEUTIC EXERCISES: CPT | Mod: GP | Performed by: PHYSICAL THERAPIST

## 2019-09-14 PROCEDURE — 97140 MANUAL THERAPY 1/> REGIONS: CPT | Mod: GP | Performed by: PHYSICAL THERAPIST

## 2019-09-23 ENCOUNTER — THERAPY VISIT (OUTPATIENT)
Dept: PHYSICAL THERAPY | Facility: CLINIC | Age: 64
End: 2019-09-23
Payer: COMMERCIAL

## 2019-09-23 DIAGNOSIS — M25.562 ACUTE PAIN OF LEFT KNEE: ICD-10-CM

## 2019-09-23 DIAGNOSIS — R60.0 LOCALIZED EDEMA: ICD-10-CM

## 2019-09-23 PROCEDURE — 97110 THERAPEUTIC EXERCISES: CPT | Mod: GP | Performed by: PHYSICAL THERAPIST

## 2019-09-23 PROCEDURE — 97140 MANUAL THERAPY 1/> REGIONS: CPT | Mod: GP | Performed by: PHYSICAL THERAPIST

## 2019-09-23 ASSESSMENT — ACTIVITIES OF DAILY LIVING (ADL)
HOW_WOULD_YOU_RATE_THE_OVERALL_FUNCTION_OF_YOUR_KNEE_DURING_YOUR_USUAL_DAILY_ACTIVITIES?: NEARLY NORMAL
PAIN: THE SYMPTOM AFFECTS MY ACTIVITY SLIGHTLY
AS_A_RESULT_OF_YOUR_KNEE_INJURY,_HOW_WOULD_YOU_RATE_YOUR_CURRENT_LEVEL_OF_DAILY_ACTIVITY?: NEARLY NORMAL
STAND: ACTIVITY IS NOT DIFFICULT
KNEE_ACTIVITY_OF_DAILY_LIVING_SCORE: 77.14
GO UP STAIRS: ACTIVITY IS NOT DIFFICULT
RISE FROM A CHAIR: ACTIVITY IS MINIMALLY DIFFICULT
GO DOWN STAIRS: ACTIVITY IS NOT DIFFICULT
RAW_SCORE: 54
WALK: ACTIVITY IS NOT DIFFICULT
WEAKNESS: I HAVE THE SYMPTOM BUT IT DOES NOT AFFECT MY ACTIVITY
SIT WITH YOUR KNEE BENT: ACTIVITY IS SOMEWHAT DIFFICULT
KNEEL ON THE FRONT OF YOUR KNEE: ACTIVITY IS FAIRLY DIFFICULT
SWELLING: THE SYMPTOM AFFECTS MY ACTIVITY SLIGHTLY
HOW_WOULD_YOU_RATE_THE_CURRENT_FUNCTION_OF_YOUR_KNEE_DURING_YOUR_USUAL_DAILY_ACTIVITIES_ON_A_SCALE_FROM_0_TO_100_WITH_100_BEING_YOUR_LEVEL_OF_KNEE_FUNCTION_PRIOR_TO_YOUR_INJURY_AND_0_BEING_THE_INABILITY_TO_PERFORM_ANY_OF_YOUR_USUAL_DAILY_ACTIVITIES?: 75
STIFFNESS: THE SYMPTOM AFFECTS MY ACTIVITY SLIGHTLY
GIVING WAY, BUCKLING OR SHIFTING OF KNEE: I HAVE THE SYMPTOM BUT IT DOES NOT AFFECT MY ACTIVITY
LIMPING: I DO NOT HAVE THE SYMPTOM
KNEE_ACTIVITY_OF_DAILY_LIVING_SUM: 54
SQUAT: ACTIVITY IS SOMEWHAT DIFFICULT

## 2019-10-11 ENCOUNTER — THERAPY VISIT (OUTPATIENT)
Dept: PHYSICAL THERAPY | Facility: CLINIC | Age: 64
End: 2019-10-11
Payer: COMMERCIAL

## 2019-10-11 DIAGNOSIS — R60.0 LOCALIZED EDEMA: ICD-10-CM

## 2019-10-11 DIAGNOSIS — M25.562 ACUTE PAIN OF LEFT KNEE: ICD-10-CM

## 2019-10-11 PROCEDURE — 97110 THERAPEUTIC EXERCISES: CPT | Mod: GP | Performed by: PHYSICAL THERAPIST

## 2019-10-11 NOTE — PROGRESS NOTES
Subjective:  HPI                    Objective:  System    Physical Exam    General     ROS    Assessment/Plan:    DISCHARGE REPORT    Progress reporting period is from 8/13//2019 to 10/11/2019.       SUBJECTIVE  Subjective changes noted by patient:  .  Subjective: Went scuba diving 2 hours daily on vacation and did yoga.  Had some increase in knee pain after this.  No significant aching at night but had pain with bending.  Notes irritation to incision area. Has been out walking for an hour.      Current pain level is 1/10  .     Previous pain level was   Initial Pain level: (0-5/10).   Changes in function:  Yes (See Goal flowsheet attached for changes in current functional level)  Adverse reaction to treatment or activity: None    OBJECTIVE  Changes noted in objective findings:  Yes,   Objective: 124 in supine before stretch.  128 after all 4's stretch.  Some posterior knee pain with approximation with all 4's stretch.  Quad stretch felt with standing stretch.  Some mild clear drainage from incision area and redness noted, left proximal lateral knee.  Discussed sending a picture and calling MD office to see if it should be evaluated for potential infection.       ASSESSMENT/PLAN  Updated problem list and treatment plan: Diagnosis 1:  Left knee TKA  Pain -  manual therapy, self management, education and home program  Decreased ROM/flexibility - manual therapy, therapeutic exercise and home program  Decreased strength - therapeutic exercise, therapeutic activities and home program  Inflammation - self management/home program  Decreased function - therapeutic activities and home program  STG/LTGs have been met or progress has been made towards goals:  Yes (See Goal flow sheet completed today.)  Assessment of Progress: The patient's condition is improving.  Self Management Plans:  Patient has been instructed in a home treatment program.    Agatha continues to require the following intervention to meet STG and LTG's:   Patient needs to continue to work on the home exercise program.      Recommendations:  This patient is ready to be discharged from therapy and continue their home treatment program.    Please refer to the daily flowsheet for treatment today, total treatment time and time spent performing 1:1 timed codes.

## 2019-11-04 ENCOUNTER — HEALTH MAINTENANCE LETTER (OUTPATIENT)
Age: 64
End: 2019-11-04

## 2020-01-30 ENCOUNTER — TELEPHONE (OUTPATIENT)
Dept: ORTHOPEDICS | Facility: CLINIC | Age: 65
End: 2020-01-30

## 2020-01-30 NOTE — TELEPHONE ENCOUNTER
[facility] Call Center    Phone Message    May a detailed message be left on voicemail: no    Reason for Call: Other: Pt is requesting a copy of the description of services for her knee arthroscopy she completed in August last year. The description should include the diagnosis, services performed during the day and surgery. Pt needs the letter so she can submit to her insurance in order to get her HSA funds for payment of bills. Pt is more interested in the diagnosis associated with the services. When completed, the information should be sent through her email, [email or web address]. Please call or email Pt for any questions.      Action Taken: Message routed to:  [facility]                       Called pt and informed her that she will have to call HIM for this. Gave HIM number  to pt. Pt will come back with any questions.      RAMIREZ Johnson

## 2020-11-16 ENCOUNTER — TELEPHONE (OUTPATIENT)
Dept: ORTHOPEDICS | Facility: CLINIC | Age: 65
End: 2020-11-16

## 2020-11-16 ENCOUNTER — HEALTH MAINTENANCE LETTER (OUTPATIENT)
Age: 65
End: 2020-11-16

## 2020-11-16 DIAGNOSIS — Z96.652 S/P TOTAL KNEE ARTHROPLASTY, LEFT: Primary | ICD-10-CM

## 2020-11-16 RX ORDER — AMOXICILLIN 500 MG/1
TABLET, FILM COATED ORAL
Qty: 4 TABLET | Refills: 4 | Status: SHIPPED | OUTPATIENT
Start: 2020-11-16 | End: 2022-07-05

## 2020-11-16 NOTE — TELEPHONE ENCOUNTER
[facility]    Phone Message    May a detailed message be left on voicemail: yes     Reason for Call: Medication Refill Request    Has the patient contacted the pharmacy for the refill? Yes   Name of medication being requested: Antibiotics for dental procedure  Provider who prescribed the medication: Dr. Julien  Pharmacy: [facility] pharmacy in [place] off of [address]   Date medication is needed: by 12/3       Action Taken: Message routed to:  [facility] ([facility] ortho    Travel Screening: Not Applicable       Dental procedure is on 12/3 -- please call when ,medication is processed

## 2020-11-16 NOTE — TELEPHONE ENCOUNTER
Antibiotics requested for dental procedures; Sent to patient's requested pharmacy for 12/3 appointment ([facility])

## 2021-09-18 ENCOUNTER — HEALTH MAINTENANCE LETTER (OUTPATIENT)
Age: 66
End: 2021-09-18

## 2021-11-13 ENCOUNTER — HEALTH MAINTENANCE LETTER (OUTPATIENT)
Age: 66
End: 2021-11-13

## 2022-01-08 ENCOUNTER — HEALTH MAINTENANCE LETTER (OUTPATIENT)
Age: 67
End: 2022-01-08

## 2022-07-04 DIAGNOSIS — Z96.652 S/P TOTAL KNEE ARTHROPLASTY, LEFT: ICD-10-CM

## 2022-07-05 RX ORDER — AMOXICILLIN 500 MG/1
CAPSULE ORAL
Qty: 4 CAPSULE | Refills: 4 | Status: SHIPPED | OUTPATIENT
Start: 2022-07-05

## 2022-08-20 ENCOUNTER — HEALTH MAINTENANCE LETTER (OUTPATIENT)
Age: 67
End: 2022-08-20

## 2022-11-20 ENCOUNTER — HEALTH MAINTENANCE LETTER (OUTPATIENT)
Age: 67
End: 2022-11-20

## 2023-09-10 ENCOUNTER — HEALTH MAINTENANCE LETTER (OUTPATIENT)
Age: 68
End: 2023-09-10

## 2023-11-19 ENCOUNTER — HEALTH MAINTENANCE LETTER (OUTPATIENT)
Age: 68
End: 2023-11-19

## 2025-04-11 NOTE — PROGRESS NOTES
[facility]  Orthopaedic Surgery Consultation by NEMO Loomis Jose Fung MRN# [id number]   Age: 69 year old YOB: 1955     Requesting physician: No ref. provider found  Dion King Agatha SCHNEIDER     Background history:  DX:  Aortic root enlargement  Premature atrial contractions  Hyperlipidemia    TREATMENTS:  2018, left total knee replacement and removal of Dr. Rober Garner ([employer], #5 CR femur,#4 tibia, 9mm CR  tibial insert, S 31x9mm patella )  2019 manipulation under anesthesia left total knee arthroplasty  2011 bilateral carpal tunnel release          History of Present Illness:   69 year old female who presents to our clinic for the evaluation of bilateral knees.  Patient is status post left total knee arthroplasty in 2018.  She states she is doing well regarding the left knee.  She does not report significant pains or limitations during her normal daily activities.  She states that her knee feels like her own.  The only time that she has some symptoms is when she squats or deeply bends the knee.  She then experiencing some pain behind the kneecap that subsides over time.    Regarding the right knee patient reports that she is experiencing pain throughout the knee but mostly lateral and posterior especially during increased activities and lateral movements.  Patient recently participated in a ballet class and noticed an significant increase in pain.  She is still able to ambulate longer distances.  This morning she walked her dogs about 4 miles.  The knee is somewhat swollen but otherwise relatively symptom-free.  Patient reports occasional effusions and night pain.  No significant initiation stiffness or soreness.  No mechanical symptoms.  No giving way.  To mitigate her pain she takes Tylenol extra strength.  She does not use any sleeves, braces.  She has not seen a physical therapist recently.  She actively participates in Pilates and  "yoga classes.  She has not had any injections in the right knee.    Social:   Occupation: N/A  Living situation: House with 3 floors and lives alone   Hobbies / Sports: Yoga, speed walking, scuba diving     Smoking: No  Alcohol: Yes  Illicit drug use: No         Physical Exam:     EXAMINATION pertinent findings:   PSYCH: Pleasant, healthy-appearing, alert, oriented x3, cooperative. Normal mood and affect.  VITAL SIGNS: Height 1.702 m (5' 7\"), weight 72.6 kg (160 lb).  Reviewed nursing intake notes.   Body mass index is 25.06 kg/m .  RESP: non labored breathing   ABD: benign, soft, non-tender, no acute peritoneal findings  SKIN: grossly normal   LYMPHATIC: grossly normal, no adenopathy, no extremity edema  NEURO: grossly normal , no motor deficits  VASCULAR: satisfactory perfusion of all extremities   MUSCULOSKELETAL:   Alignment: Neutral  Gait: Normal.  Bilateral hips exhibited full range of motion.  No pain upon rotations.  Lasegue's test negative.  No tenderness to palpation over greater trochanteric region.    L knee: Well-healed incision without signs of infection.  -0-0 . Straight leg raise +. No redness, warmth or skin changes present. Effusion No. Ligamentously stable in both ML and AP direction. Normal PF tracking without crepitus.      R knee: -0-0 .  Deep flexion is recognizably painful.  Straight leg raise +. No redness, warmth or skin changes present. Effusion minimal. Ligamentously stable in both ML and AP direction. Normal PF tracking with some crepitus.  Rabot is negative.  Apprehension -. Meniscal provocation tests are negative.  There is recognizable tenderness to palpation over the joint line.     Bilateral LE:   Thigh and leg compartments soft and compressible   +Quad/TA/GSC/FHL/EHL   SILT DP/SP/Elizabeth/Saph/Tib nerve distributions   Palpable dorsalis pedis pulse          Data:   All laboratory data reviewed  All imaging studies reviewed by me personally.    XR alignment films " 4/23/2025:  My interpretation: Valgus alignment of right lower extremity.  Neutral alignment of left lower extremity.    XR bilateral knees 4/23/2025:  My interpretation: Status post left total knee arthroplasty.  Adequate sizing, orientation and fixation of components.  No signs of complications.  Moderate to severe osteoarthritic changes of right knee lateral compartment with joint space narrowing, presence of marginal osteophytes, sclerosis and subchondral cyst.  Mild to moderate osteoarthritic changes of medial and patellofemoral compartments.         Assessment and Plan:   Assessment:  69-year-old female status post left total knee arthroplasty that is functioning well.  Furthermore presenting with pain and discomfort right knee most likely associated with underlying osteoarthritic changes most notably in lateral compartment.  Relatively benign presentation today.     Plan:  I had a long discussion with the patient regarding etiology and ongoing management options.  Reviewed surgical and nonsurgical treatments.  The non-surgical options include activity modification, pain medication, PT, bracing and injection therapy.  As for surgery we discussed the option of total knee replacement surgery.  Given patient's current symptoms I would not recommend pursuing any surgical intervention.  Patient agrees to continue with nonsurgical treatment regarding the right knee.  This would consist of activity modification, occasional pain medication and home exercise regimen.  If this does not provide sufficient relief formal physical therapy,  bracing and injection therapy can be considered.  Regarding the left knee no further interventions are deemed necessary.  Patient understands and agrees to the treatment plan as set forth.  All questions were answered.  Will follow-up with her on an as-needed basis.      Thank you for your referral.    Tomás Pratt MD, PhD     Adult  Reconstruction  [facility] Department of Orthopaedic Surgery    This note was created using dictation software and may contain errors.  Please contact the creator for any clarifications that are needed.    DATA for DOCUMENTATION:         Past Medical History:     Patient Active Problem List   Diagnosis    Total knee replacement status    Aftercare following joint replacement [Z47.1]    Status post total knee replacement, left     No past medical history on file.    Also see scanned health assessment forms.       Past Surgical History:     Past Surgical History:   Procedure Laterality Date    ARTHROPLASTY KNEE Left 3/29/2018    Procedure: ARTHROPLASTY KNEE;  Left Total Knee Arthroplasty; Left knee Hardware(Boothe staple) removal and Left lateral retinacular lengthening;  Surgeon: Pamela Richter MD;  Location: [facility] OR    LYSIS OF ADHESIONS KNEE Left 8/9/2019    Procedure: Left Knee Mini Open Lysis of Adhesions;  Surgeon: Pamela Richter MD;  Location:  OR    ORTHOPEDIC SURGERY Left 2003    ACL reconstruction    REMOVE HARDWARE KNEE Left 3/29/2018    Procedure: REMOVE HARDWARE KNEE;;  Surgeon: Pamela Richter MD;  Location: [facility] OR            Social History:     Social History     Socioeconomic History    Marital status:      Spouse name: Not on file    Number of children: Not on file    Years of education: Not on file    Highest education level: Not on file   Occupational History    Not on file   Tobacco Use    Smoking status: Never    Smokeless tobacco: Never   Substance and Sexual Activity    Alcohol use: Yes     Comment: 1-2 glasses of wine daily    Drug use: No    Sexual activity: Not on file   Other Topics Concern    Not on file   Social History Narrative    Not on file     Social Drivers of Health     Financial Resource Strain: Not on file   Food Insecurity: Not on file   Transportation Needs: Not on file   Physical Activity: Not on file   Stress: Not on file   Social  Connections: Not on file   Interpersonal Safety: Unknown (3/11/2024)    Received from [facility]    Humiliation, Afraid, Rape, and Kick questionnaire     Fear of Current or Ex-Partner: Not on file     Emotionally Abused: Not on file     Physically Abused: No     Sexually Abused: No   Housing Stability: Not on file            Family History:     No family history on file.         Medications:     Current Outpatient Medications   Medication Sig Dispense Refill    acetaminophen (TYLENOL) 325 MG tablet Take 1-2 tablets every 4 hours as needed for pain. (Patient not taking: Reported on 9/10/2019) 100 tablet 0    amoxicillin (AMOXIL) 500 MG capsule TAKE 4 CAPSULES (2000 MG) BY MOUTH 1 HOUR BEFORE DENTAL PROCEDURES/CLEANINGS. 4 capsule 4    aspirin (ASA) 325 MG tablet Take 1 tablet (325 mg) by mouth daily To help prevent blood clots. (Patient not taking: Reported on 9/10/2019) 30 tablet 0    estradiol (VIVELLE-DOT) 0.05 MG/24HR patch Place 1 patch onto the skin twice a week      glucosamine-chondroitin 500-400 MG CAPS per capsule Take 1 capsule by mouth 2 times daily      multivitamin, therapeutic with minerals (THERA-VIT-M) TABS tablet Take 1 tablet by mouth daily      Omega-3 Fatty Acids (OMEGA-3 FISH OIL PO) Take 2 capsules by mouth 2 times daily       ondansetron (ZOFRAN-ODT) 4 MG ODT tab Take 1-2 tablets (4-8 mg) by mouth every 8 hours as needed for nausea 4 tablet 0    oxyCODONE (ROXICODONE) 5 MG tablet Take 1-2 tablets (5-10 mg) by mouth every 4 hours as needed for moderate to severe pain (Patient not taking: Reported on 9/10/2019) 10 tablet 0    predniSONE (DELTASONE) 5 MG tablet Take 1 tablet (5 mg) by mouth daily Take four tablets (20 mg) daily for first 10 days, two tablets (10 mg) daily for next 10 days, and one tablet (5 mg) daily for last 10 days, (Patient not taking: Reported on 9/10/2019) 70 tablet 0    Progesterone Micronized (PROGESTERONE PO) Take 1 tablet by mouth daily      senna-docusate  (SENOKOT-S/PERICOLACE) 8.6-50 MG tablet Take 1-2 tablets by mouth 2 times daily (Patient not taking: Reported on 9/10/2019) 30 tablet 0    Vitamin D, Cholecalciferol, 1000 UNITS CAPS Take 1,000 Units by mouth daily       No current facility-administered medications for this visit.              Review of Systems:   A comprehensive 10 point review of systems (constitutional, ENT, cardiac, peripheral vascular, lymphatic, respiratory, GI, , Musculoskeletal, skin, Neurological) was performed and found to be negative except as described in this note.     See intake form completed by patient

## 2025-04-18 ASSESSMENT — KOOS JR
TWISING OR PIVOTING ON KNEE: SEVERE
HOW SEVERE IS YOUR KNEE STIFFNESS AFTER FIRST WAKING IN MORNING: MODERATE
BENDING TO THE FLOOR TO PICK UP OBJECT: MILD
STRAIGHTENING KNEE FULLY: MODERATE
RISING FROM SITTING: SEVERE
STANDING UPRIGHT: MILD
GOING UP OR DOWN STAIRS: MODERATE
KOOS JR SCORING: 52.47

## 2025-04-22 NOTE — TELEPHONE ENCOUNTER
Action April 22, 2025 2:11 PM MT   Action Taken Sent a request for imaging from .        DIAGNOSIS: BILATERAL KNEE   APPOINTMENT DATE: 04/23/2025   NOTES STATUS DETAILS   OFFICE NOTE from referring provider SELF    OFFICE NOTE from other specialist Internal 09/10/2019 - Pamela Richter MD - [facility]   OPERATIVE REPORT Internal 08/09/2019 - Left Knee Mini Open Lysis of Adhesions.    03/29/2018 - Left Total Knee Arthroplasty; Left knee Hardware(Boothe staple) removal and Left lateral retinacular lengthening.     XRAYS (IMAGES & REPORTS) PACS Internal    HP:  04/25/2016, 02/18/2013 - LT Knee

## 2025-04-23 ENCOUNTER — ANCILLARY PROCEDURE (OUTPATIENT)
Dept: GENERAL RADIOLOGY | Facility: CLINIC | Age: 70
End: 2025-04-23
Attending: STUDENT IN AN ORGANIZED HEALTH CARE EDUCATION/TRAINING PROGRAM
Payer: COMMERCIAL

## 2025-04-23 ENCOUNTER — OFFICE VISIT (OUTPATIENT)
Dept: ORTHOPEDICS | Facility: CLINIC | Age: 70
End: 2025-04-23
Payer: COMMERCIAL

## 2025-04-23 ENCOUNTER — PRE VISIT (OUTPATIENT)
Dept: ORTHOPEDICS | Facility: CLINIC | Age: 70
End: 2025-04-23

## 2025-04-23 VITALS — HEIGHT: 67 IN | BODY MASS INDEX: 25.11 KG/M2 | WEIGHT: 160 LBS

## 2025-04-23 DIAGNOSIS — Z96.652 S/P TOTAL KNEE REPLACEMENT, LEFT: Primary | ICD-10-CM

## 2025-04-23 DIAGNOSIS — M17.11 OSTEOARTHROSIS, LOCALIZED, PRIMARY, KNEE, RIGHT: ICD-10-CM

## 2025-04-23 DIAGNOSIS — M25.561 RIGHT KNEE PAIN: ICD-10-CM

## 2025-04-23 DIAGNOSIS — Z96.652 STATUS POST TOTAL KNEE REPLACEMENT, LEFT: ICD-10-CM

## 2025-04-23 PROCEDURE — 99203 OFFICE O/P NEW LOW 30 MIN: CPT | Performed by: STUDENT IN AN ORGANIZED HEALTH CARE EDUCATION/TRAINING PROGRAM

## 2025-04-23 PROCEDURE — 77073 BONE LENGTH STUDIES: CPT | Performed by: STUDENT IN AN ORGANIZED HEALTH CARE EDUCATION/TRAINING PROGRAM

## 2025-04-23 PROCEDURE — 73562 X-RAY EXAM OF KNEE 3: CPT | Mod: LT | Performed by: RADIOLOGY

## 2025-04-23 RX ORDER — DESONIDE 0.5 MG/G
CREAM TOPICAL
COMMUNITY
Start: 2025-01-07

## 2025-04-23 RX ORDER — TRETINOIN 0.25 MG/G
CREAM TOPICAL
COMMUNITY
Start: 2024-07-12

## 2025-04-23 NOTE — LETTER
4/23/2025      Agatha Fung  [address]  [place] [address]      Dear Colleague,    Thank you for referring your patient, Agatha Fung, to the [facility] [place]. Please see a copy of my visit note below.        [facility]  Orthopaedic Surgery Consultation by Tomás Pratt M.D.    Agatha Fung MRN# [id number]   Age: 69 year old YOB: 1955     Requesting physician: No ref. provider found  Agatha Triana     Background history:  DX:  Aortic root enlargement  Premature atrial contractions  Hyperlipidemia    TREATMENTS:  2018, left total knee replacement and removal of Dr. Rober Garner ([employer] Tri[employer]al[employer] System, #5 CR femur,#4 tibia, 9mm CR  tibial insert, S 31x9mm patella )  2019 manipulation under anesthesia left total knee arthroplasty  2011 bilateral carpal tunnel release          History of Present Illness:   69 year old female who presents to our clinic for the evaluation of bilateral knees.  Patient is status post left total knee arthroplasty in 2018.  She states she is doing well regarding the left knee.  She does not report significant pains or limitations during her normal daily activities.  She states that her knee feels like her own.  The only time that she has some symptoms is when she squats or deeply bends the knee.  She then experiencing some pain behind the kneecap that subsides over time.    Regarding the right knee patient reports that she is experiencing pain throughout the knee but mostly lateral and posterior especially during increased activities and lateral movements.  Patient recently participated in a ballet class and noticed an significant increase in pain.  She is still able to ambulate longer distances.  This morning she walked her dogs about 4 miles.  The knee is somewhat swollen but otherwise relatively symptom-free.  Patient reports occasional effusions and night pain.  No  "significant initiation stiffness or soreness.  No mechanical symptoms.  No giving way.  To mitigate her pain she takes Tylenol extra strength.  She does not use any sleeves, braces.  She has not seen a physical therapist recently.  She actively participates in Pilates and yoga classes.  She has not had any injections in the right knee.    Social:   Occupation: N/A  Living situation: House with 3 floors and lives alone   Hobbies / Sports: Yoga, speed walking, scuba diving     Smoking: No  Alcohol: Yes  Illicit drug use: No         Physical Exam:     EXAMINATION pertinent findings:   PSYCH: Pleasant, healthy-appearing, alert, oriented x3, cooperative. Normal mood and affect.  VITAL SIGNS: Height 1.702 m (5' 7\"), weight 72.6 kg (160 lb).  Reviewed nursing intake notes.   Body mass index is 25.06 kg/m .  RESP: non labored breathing   ABD: benign, soft, non-tender, no acute peritoneal findings  SKIN: grossly normal   LYMPHATIC: grossly normal, no adenopathy, no extremity edema  NEURO: grossly normal , no motor deficits  VASCULAR: satisfactory perfusion of all extremities   MUSCULOSKELETAL:   Alignment: Neutral  Gait: Normal.  Bilateral hips exhibited full range of motion.  No pain upon rotations.  Lasegue's test negative.  No tenderness to palpation over greater trochanteric region.    L knee: Well-healed incision without signs of infection.  -0-0 . Straight leg raise +. No redness, warmth or skin changes present. Effusion No. Ligamentously stable in both ML and AP direction. Normal PF tracking without crepitus.      R knee: -0-0 .  Deep flexion is recognizably painful.  Straight leg raise +. No redness, warmth or skin changes present. Effusion minimal. Ligamentously stable in both ML and AP direction. Normal PF tracking with some crepitus.  Rabot is negative.  Apprehension -. Meniscal provocation tests are negative.  There is recognizable tenderness to palpation over the joint line.     Bilateral " LE:   Thigh and leg compartments soft and compressible   +Quad/TA/GSC/FHL/EHL   SILT DP/SP/Elizabeth/Saph/Tib nerve distributions   Palpable dorsalis pedis pulse          Data:   All laboratory data reviewed  All imaging studies reviewed by me personally.    XR alignment films 4/23/2025:  My interpretation: Valgus alignment of right lower extremity.  Neutral alignment of left lower extremity.    XR bilateral knees 4/23/2025:  My interpretation: Status post left total knee arthroplasty.  Adequate sizing, orientation and fixation of components.  No signs of complications.  Moderate to severe osteoarthritic changes of right knee lateral compartment with joint space narrowing, presence of marginal osteophytes, sclerosis and subchondral cyst.  Mild to moderate osteoarthritic changes of medial and patellofemoral compartments.         Assessment and Plan:   Assessment:  69-year-old female status post left total knee arthroplasty that is functioning well.  Furthermore presenting with pain and discomfort right knee most likely associated with underlying osteoarthritic changes most notably in lateral compartment.  Relatively benign presentation today.     Plan:  I had a long discussion with the patient regarding etiology and ongoing management options.  Reviewed surgical and nonsurgical treatments.  The non-surgical options include activity modification, pain medication, PT, bracing and injection therapy.  As for surgery we discussed the option of total knee replacement surgery.  Given patient's current symptoms I would not recommend pursuing any surgical intervention.  Patient agrees to continue with nonsurgical treatment regarding the right knee.  This would consist of activity modification, occasional pain medication and home exercise regimen.  If this does not provide sufficient relief formal physical therapy,  bracing and injection therapy can be considered.  Regarding the left knee no further interventions are deemed  necessary.  Patient understands and agrees to the treatment plan as set forth.  All questions were answered.  Will follow-up with her on an as-needed basis.      Thank you for your referral.    Tomás Pratt MD, PhD     Adult Reconstruction  [facility] Department of Orthopaedic Surgery    This note was created using dictation software and may contain errors.  Please contact the creator for any clarifications that are needed.    DATA for DOCUMENTATION:         Past Medical History:     Patient Active Problem List   Diagnosis     Total knee replacement status     Aftercare following joint replacement [Z47.1]     Status post total knee replacement, left     No past medical history on file.    Also see scanned health assessment forms.       Past Surgical History:     Past Surgical History:   Procedure Laterality Date     ARTHROPLASTY KNEE Left 3/29/2018    Procedure: ARTHROPLASTY KNEE;  Left Total Knee Arthroplasty; Left knee Hardware(Boothe staple) removal and Left lateral retinacular lengthening;  Surgeon: Pamela Richter MD;  Location: [facility] OR     LYSIS OF ADHESIONS KNEE Left 8/9/2019    Procedure: Left Knee Mini Open Lysis of Adhesions;  Surgeon: Pamela Richter MD;  Location:  OR     ORTHOPEDIC SURGERY Left 2003    ACL reconstruction     REMOVE HARDWARE KNEE Left 3/29/2018    Procedure: REMOVE HARDWARE KNEE;;  Surgeon: Pamela Richter MD;  Location: [facility] OR            Social History:     Social History     Socioeconomic History     Marital status:      Spouse name: Not on file     Number of children: Not on file     Years of education: Not on file     Highest education level: Not on file   Occupational History     Not on file   Tobacco Use     Smoking status: Never     Smokeless tobacco: Never   Substance and Sexual Activity     Alcohol use: Yes     Comment: 1-2 glasses of wine daily     Drug use: No     Sexual activity: Not on file   Other Topics Concern      Not on file   Social History Narrative     Not on file     Social Drivers of Health     Financial Resource Strain: Not on file   Food Insecurity: Not on file   Transportation Needs: Not on file   Physical Activity: Not on file   Stress: Not on file   Social Connections: Not on file   Interpersonal Safety: Unknown (3/11/2024)    Received from [facility]    Humiliation, Afraid, Rape, and Kick questionnaire      Fear of Current or Ex-Partner: Not on file      Emotionally Abused: Not on file      Physically Abused: No      Sexually Abused: No   Housing Stability: Not on file            Family History:     No family history on file.         Medications:     Current Outpatient Medications   Medication Sig Dispense Refill     acetaminophen (TYLENOL) 325 MG tablet Take 1-2 tablets every 4 hours as needed for pain. (Patient not taking: Reported on 9/10/2019) 100 tablet 0     amoxicillin (AMOXIL) 500 MG capsule TAKE 4 CAPSULES (2000 MG) BY MOUTH 1 HOUR BEFORE DENTAL PROCEDURES/CLEANINGS. 4 capsule 4     aspirin (ASA) 325 MG tablet Take 1 tablet (325 mg) by mouth daily To help prevent blood clots. (Patient not taking: Reported on 9/10/2019) 30 tablet 0     estradiol (VIVELLE-DOT) 0.05 MG/24HR patch Place 1 patch onto the skin twice a week       glucosamine-chondroitin 500-400 MG CAPS per capsule Take 1 capsule by mouth 2 times daily       multivitamin, therapeutic with minerals (THERA-VIT-M) TABS tablet Take 1 tablet by mouth daily       Omega-3 Fatty Acids (OMEGA-3 FISH OIL PO) Take 2 capsules by mouth 2 times daily        ondansetron (ZOFRAN-ODT) 4 MG ODT tab Take 1-2 tablets (4-8 mg) by mouth every 8 hours as needed for nausea 4 tablet 0     oxyCODONE (ROXICODONE) 5 MG tablet Take 1-2 tablets (5-10 mg) by mouth every 4 hours as needed for moderate to severe pain (Patient not taking: Reported on 9/10/2019) 10 tablet 0     predniSONE (DELTASONE) 5 MG tablet Take 1 tablet (5 mg) by mouth daily Take four tablets (20 mg)  daily for first 10 days, two tablets (10 mg) daily for next 10 days, and one tablet (5 mg) daily for last 10 days, (Patient not taking: Reported on 9/10/2019) 70 tablet 0     Progesterone Micronized (PROGESTERONE PO) Take 1 tablet by mouth daily       senna-docusate (SENOKOT-S/PERICOLACE) 8.6-50 MG tablet Take 1-2 tablets by mouth 2 times daily (Patient not taking: Reported on 9/10/2019) 30 tablet 0     Vitamin D, Cholecalciferol, 1000 UNITS CAPS Take 1,000 Units by mouth daily       No current facility-administered medications for this visit.              Review of Systems:   A comprehensive 10 point review of systems (constitutional, ENT, cardiac, peripheral vascular, lymphatic, respiratory, GI, , Musculoskeletal, skin, Neurological) was performed and found to be negative except as described in this note.     See intake form completed by patient      Again, thank you for allowing me to participate in the care of your patient.        Sincerely,        Tomás Pratt MD    Electronically signed

## 2025-05-10 ENCOUNTER — HEALTH MAINTENANCE LETTER (OUTPATIENT)
Age: 70
End: 2025-05-10

## (undated) DEVICE — BASIN SET MAJOR

## (undated) DEVICE — DRAIN HEMOVAC RESERVOIR KIT 10FR 1/8" MED 00-2550-002-10

## (undated) DEVICE — DRSG ABDOMINAL 07 1/2X8" 7197D

## (undated) DEVICE — DRSG TEGADERM 4X4 3/4" 1626W

## (undated) DEVICE — BLADE SAW SAGITTAL STRK 19.5X90X1.27MM 2108-109-000S11

## (undated) DEVICE — GLOVE GAMMEX NEOPRENE ULTRA SZ 6.5 LF 8513

## (undated) DEVICE — PREP CHLORAPREP 26ML TINTED ORANGE  260815

## (undated) DEVICE — SU MONOCRYL 3-0 PS-1 27" Y936H

## (undated) DEVICE — Device

## (undated) DEVICE — SOL NACL 0.9% IRRIG 1000ML BOTTLE 2F7124

## (undated) DEVICE — TOURNIQUET CUFF 30" REPRO BLUE 60-7070-105

## (undated) DEVICE — HOOD FLYTE W/PEELAWAY 408-800-100

## (undated) DEVICE — GLOVE PROTEXIS POWDER FREE SMT 6.5  2D72PT65X

## (undated) DEVICE — TOURNIQUET SGL  BLADDER 30"X4" BLUE 5921030135

## (undated) DEVICE — SU ETHIBOND 1 CT-1 30" X425H

## (undated) DEVICE — SUCTION MANIFOLD DORNOCH ULTRA CART UL-CL500

## (undated) DEVICE — ESU GROUND PAD ADULT W/CORD E7507

## (undated) DEVICE — PREP POVIDONE IODINE SOLUTION 10% 120ML

## (undated) DEVICE — LINEN TOWEL PACK X5 5464

## (undated) DEVICE — LINEN ORTHO PACK 5446

## (undated) DEVICE — PREP SKIN SCRUB TRAY 4461A

## (undated) DEVICE — SU DERMABOND PRINEO CLR602US

## (undated) DEVICE — GLOVE PROTEXIS W/NEU-THERA 8.0  2D73TE80

## (undated) DEVICE — SUCTION IRR SYSTEM W/O TIP INTERPULSE HANDPIECE 0210-100-000

## (undated) DEVICE — SPONGE LAP 18X18" X8435

## (undated) DEVICE — SU VICRYL 2-0 CT-2 27" UND J269H

## (undated) DEVICE — BONE CEMENT MIXEVAC III HI VAC KIT  0206-015-000

## (undated) DEVICE — STRAP KNEE/BODY 31143004

## (undated) DEVICE — BONE CLEANING TIP INTERPULSE  0210-010-000

## (undated) DEVICE — GLOVE PROTEXIS BLUE W/NEU-THERA 8.0  2D73EB80

## (undated) DEVICE — PEN MARKING SKIN W/PAPER RULER 31145785

## (undated) DEVICE — CAST PADDING 6" STERILE 9046S

## (undated) DEVICE — SU VICRYL 1 CT-1 27" J341H

## (undated) DEVICE — DRSG DRAIN 4X4" 7086

## (undated) DEVICE — BNDG SPANDAGRIP SZ G LF BEIGE 4.5" SAG13145

## (undated) DEVICE — GOWN IMPERVIOUS SPECIALTY XLG/XLONG 32474

## (undated) DEVICE — ESU PENCIL W/SMOKE EVAC NEPTUNE STRYKER 0703-046-000

## (undated) DEVICE — SUCTION MANIFOLD NEPTUNE 2 SYS 1 PORT 702-025-000

## (undated) DEVICE — BLADE SAW RECIP STRK 70X12.5X1.2MM 0277-096-281

## (undated) DEVICE — SOL WATER IRRIG 1000ML BOTTLE 2F7114

## (undated) DEVICE — SU VICRYL 1 CT-1 36" J347H

## (undated) DEVICE — GLOVE PROTEXIS MICRO 6.5  2D73PM65

## (undated) RX ORDER — ACETAMINOPHEN 325 MG/1
TABLET ORAL
Status: DISPENSED
Start: 2018-03-29

## (undated) RX ORDER — GABAPENTIN 300 MG/1
CAPSULE ORAL
Status: DISPENSED
Start: 2018-03-29

## (undated) RX ORDER — CELECOXIB 200 MG/1
CAPSULE ORAL
Status: DISPENSED
Start: 2018-03-29

## (undated) RX ORDER — ACETAMINOPHEN 325 MG/1
TABLET ORAL
Status: DISPENSED
Start: 2019-08-09

## (undated) RX ORDER — CEFAZOLIN SODIUM 1 G/3ML
INJECTION, POWDER, FOR SOLUTION INTRAMUSCULAR; INTRAVENOUS
Status: DISPENSED
Start: 2019-08-09

## (undated) RX ORDER — FENTANYL CITRATE 50 UG/ML
INJECTION, SOLUTION INTRAMUSCULAR; INTRAVENOUS
Status: DISPENSED
Start: 2018-03-29

## (undated) RX ORDER — POLYMYXIN B SULFATE 500000 [IU]/1
INJECTION, POWDER, LYOPHILIZED, FOR SOLUTION INTRAMUSCULAR; INTRATHECAL; INTRAVENOUS; OPHTHALMIC
Status: DISPENSED
Start: 2019-08-09

## (undated) RX ORDER — OXYCODONE HYDROCHLORIDE 5 MG/1
TABLET ORAL
Status: DISPENSED
Start: 2018-03-29

## (undated) RX ORDER — PROPOFOL 10 MG/ML
INJECTION, EMULSION INTRAVENOUS
Status: DISPENSED
Start: 2019-08-09

## (undated) RX ORDER — GENTAMICIN 40 MG/ML
INJECTION, SOLUTION INTRAMUSCULAR; INTRAVENOUS
Status: DISPENSED
Start: 2019-08-09

## (undated) RX ORDER — PROPOFOL 10 MG/ML
INJECTION, EMULSION INTRAVENOUS
Status: DISPENSED
Start: 2018-03-29

## (undated) RX ORDER — DEXAMETHASONE SODIUM PHOSPHATE 4 MG/ML
INJECTION, SOLUTION INTRA-ARTICULAR; INTRALESIONAL; INTRAMUSCULAR; INTRAVENOUS; SOFT TISSUE
Status: DISPENSED
Start: 2019-08-09

## (undated) RX ORDER — BACITRACIN 50000 [IU]/1
INJECTION, POWDER, FOR SOLUTION INTRAMUSCULAR
Status: DISPENSED
Start: 2019-08-09

## (undated) RX ORDER — MINERAL OIL
OIL (ML) MISCELLANEOUS
Status: DISPENSED
Start: 2019-08-09

## (undated) RX ORDER — CEFAZOLIN SODIUM 1 G/3ML
INJECTION, POWDER, FOR SOLUTION INTRAMUSCULAR; INTRAVENOUS
Status: DISPENSED
Start: 2018-03-29

## (undated) RX ORDER — GABAPENTIN 300 MG/1
CAPSULE ORAL
Status: DISPENSED
Start: 2019-08-09

## (undated) RX ORDER — OXYCODONE HYDROCHLORIDE 5 MG/1
TABLET ORAL
Status: DISPENSED
Start: 2019-08-09

## (undated) RX ORDER — CEFAZOLIN SODIUM 2 G/100ML
INJECTION, SOLUTION INTRAVENOUS
Status: DISPENSED
Start: 2018-03-29

## (undated) RX ORDER — LIDOCAINE HYDROCHLORIDE 20 MG/ML
INJECTION, SOLUTION EPIDURAL; INFILTRATION; INTRACAUDAL; PERINEURAL
Status: DISPENSED
Start: 2019-08-09

## (undated) RX ORDER — ONDANSETRON 2 MG/ML
INJECTION INTRAMUSCULAR; INTRAVENOUS
Status: DISPENSED
Start: 2019-08-09

## (undated) RX ORDER — TRANEXAMIC ACID 100 MG/ML
INJECTION, SOLUTION INTRAVENOUS
Status: DISPENSED
Start: 2019-08-09

## (undated) RX ORDER — FENTANYL CITRATE 50 UG/ML
INJECTION, SOLUTION INTRAMUSCULAR; INTRAVENOUS
Status: DISPENSED
Start: 2019-08-09